# Patient Record
Sex: FEMALE | Race: WHITE | NOT HISPANIC OR LATINO | Employment: FULL TIME | URBAN - METROPOLITAN AREA
[De-identification: names, ages, dates, MRNs, and addresses within clinical notes are randomized per-mention and may not be internally consistent; named-entity substitution may affect disease eponyms.]

---

## 2017-03-07 ENCOUNTER — TELEPHONE (OUTPATIENT)
Dept: FAMILY MEDICINE | Facility: CLINIC | Age: 52
End: 2017-03-07

## 2017-03-07 NOTE — TELEPHONE ENCOUNTER
Panel Management Review      Patient has the following on her problem list:     Hypertension   Last three blood pressure readings:  BP Readings from Last 3 Encounters:   11/30/16 (!) 146/98   04/28/16 130/86   03/15/16 (!) 136/98     Blood pressure: FAILED    HTN Guidelines:  Age 18-59 BP range:  Less than 140/90  Age 60-85 with Diabetes:  Less than 140/90  Age 60-85 without Diabetes:  less than 150/90      Composite cancer screening  Chart review shows that this patient is due/due soon for the following None  Summary:    Patient is due/failing the following:   BP CHECK    Action needed:   Patient needs nurse only appointment.    Type of outreach:    Phone, left message for patient to call back.     Questions for provider review:    None                                                                                                                                    Palak Michaels CMA       Chart routed to Care Team .

## 2017-05-17 ENCOUNTER — TELEPHONE (OUTPATIENT)
Dept: FAMILY MEDICINE | Facility: CLINIC | Age: 52
End: 2017-05-17

## 2017-05-17 NOTE — LETTER
[address] [id number] [phone]      May 17, 2017    Claudette Burris                                                                                                                     [address] [address] [place] [id number]            Dear Claudette,        Our records show you are overdue to see your provider to follow up on your blood pressure and hormones. Please call the clinic at [phone] to schedule this at your convenience.        Sincerely,         [facility]

## 2017-05-17 NOTE — TELEPHONE ENCOUNTER
Panel Management Review      Patient has the following on her problem list:     Hypertension   Last three blood pressure readings:  BP Readings from Last 3 Encounters:   11/30/16 (!) 146/98   04/28/16 130/86   03/15/16 (!) 136/98     Blood pressure: FAILED    HTN Guidelines:  Age 18-59 BP range:  Less than 140/90  Age 60-85 with Diabetes:  Less than 140/90  Age 60-85 without Diabetes:  less than 150/90      Composite cancer screening  Chart review shows that this patient is due/due soon for the following None  Summary:    Patient is due/failing the following:   Office visit with RM to follow up on BP and hormone    Action needed:   Patient needs office visit for above.    Type of outreach:    Sent letter.    Questions for provider review:    None                                                                                                                                    Palak Michaels CMA       Chart routed to Care Team .

## 2017-05-23 ENCOUNTER — OFFICE VISIT (OUTPATIENT)
Dept: DERMATOLOGY | Facility: CLINIC | Age: 52
End: 2017-05-23
Payer: COMMERCIAL

## 2017-05-23 DIAGNOSIS — D48.5 NEOPLASM OF UNCERTAIN BEHAVIOR OF SKIN: Primary | ICD-10-CM

## 2017-05-23 DIAGNOSIS — I78.1 SPIDER ANGIOMA: ICD-10-CM

## 2017-05-23 PROCEDURE — 88305 TISSUE EXAM BY PATHOLOGIST: CPT | Performed by: DERMATOLOGY

## 2017-05-23 PROCEDURE — 11101 HC BIOPSY SKIN/SUBQ/MUC MEM, EACH ADDTL LESION: CPT | Performed by: DERMATOLOGY

## 2017-05-23 PROCEDURE — 99213 OFFICE O/P EST LOW 20 MIN: CPT | Mod: 25 | Performed by: DERMATOLOGY

## 2017-05-23 PROCEDURE — 11100 HC BIOPSY SKIN/SUBQ/MUC MEM, SINGLE LESION: CPT | Performed by: DERMATOLOGY

## 2017-05-23 ASSESSMENT — PAIN SCALES - GENERAL: PAINLEVEL: NO PAIN (0)

## 2017-05-23 NOTE — NURSING NOTE
Dermatology Rooming Note    Claudette Burris's goals for this visit include:   Chief Complaint   Patient presents with     Derm Problem     Red lesion on glabella present since February.  Lesion on back along bra line that ithces.       Is a scribe okay for this visit:YES    Are records needed for this visit(If yes, obtain release of information): No     Vitals: There were no vitals taken for this visit.    Referring Provider:  ESTABLISHED PATIENT  No address on file    Christa Jackson RN

## 2017-05-23 NOTE — PATIENT INSTRUCTIONS
Wound Care After a Biopsy    What is a skin biopsy?  A skin biopsy allows the doctor to examine a very small piece of tissue under the microscope to determine the diagnosis and the best treatment for the skin condition. A local anesthetic (numbing medicine)  is injected with a very small needle into the skin area to be tested. A small piece of skin is taken from the area. Sometimes a suture (stitch) is used.     What are the risks of a skin biopsy?  I will experience scar, bleeding, swelling, pain, crusting and redness. I may experience incomplete removal or recurrence. Risks of this procedure are excessive bleeding, bruising, infection, nerve damage, numbness, thick (hypertrophic or keloidal) scar and non-diagnostic biopsy.    How should I care for my wound for the first 24 hours?    Keep the wound dry and covered for 24 hours    If it bleeds, hold direct pressure on the area for 15 minutes. If bleeding does not stop then go to the emergency room    Avoid strenuous exercise the first 1-2 days or as your doctor instructs you    How should I care for the wound after 24 hours?    After 24 hours, remove the bandage    You may bathe or shower as normal    If you had a scalp biopsy, you can shampoo as usual and can use shower water to clean the biopsy site daily    Clean the wound twice a day with gentle soap and water    Do not scrub, be gentle    Apply white petroleum/Vaseline after cleaning the wound with a cotton swab or a clean finger, and keep the site covered with a Bandaid /bandage. Bandages are not necessary with a scalp biopsy    If you are unable to cover the site with a Bandaid /bandage, re-apply ointment 2-3 times a day to keep the site moist. Moisture will help with healing    Avoid strenuous activity for first 1-2 days    Avoid lakes, rivers, pools, and oceans until the stitches are removed or the site is healed    How do I clean my wound?    Wash hands thoroughly with soap or use hand  before all  wound care    Clean the wound with gentle soap and water    Apply white petroleum/Vaseline  to wound after it is clean    Replace the Bandaid /bandage to keep the wound covered for the first few days or as instructed by your doctor    If you had a scalp biopsy, warm shower water to the area on a daily basis should suffice    What should I use to clean my wound?     Cotton-tipped applicators (Qtips )    White petroleum jelly (Vaseline ). Use a clean new container and use Q-tips to apply.    Bandaids   as needed    Gentle soap     How should I care for my wound long term?    Do not get your wound dirty    Keep up with wound care for one week or until the area is healed.    A small scab will form and fall off by itself when the area is completely healed. The area will be red and will become pink in color as it heals. Sun protection is very important for how your scar will turn out. Sunscreen with an SPF 30 or greater is recommended once the area is healed.    If you have stitches, stitches need to be removed in 11-14 days. You may return to our clinic for this or you may have it done locally at your doctor s office.    You should have some soreness but it should be mild and slowly go away over several days. Talk to your doctor about using tylenol for pain,    When should I call my doctor?  If you have increased:     Pain or swelling    Pus or drainage (clear or slightly yellow drainage is ok)    Temperature over 100F    Spreading redness or warmth around wound    When will I hear about my results?  The biopsy results can take 2-3 weeks to come back. The clinic will call you with the results, send you a [employer] message, or have you schedule a follow-up clinic or phone time to discuss the results. Contact our clinics if you do not hear from us in 3 weeks.     Who should I call with questions?    [facility]: [phone]     [facility]: [phone]    For  urgent needs outside of business hours call the [facility] at [phone] and ask for the dermatology resident on call      Cryotherapy    What is it?    Use of a very cold liquid, such as liquid nitrogen, to freeze and destroy abnormal skin cells that need to be removed    What should I expect?    Tenderness and redness    A small blister that might grow and fill with dark purple blood. There may be crusting.    More than one treatment may be needed if the lesions do not go away.    How do I care for the treated area?    Gently wash the area with your hands when bathing.    Use a thin layer of Vaseline to help with healing. You may use a Band-Aid.     The area should heal within 7-10 days and may leave behind a pink or lighter color.     Do not use an antibiotic or Neosporin ointment.     You may take acetaminophen (Tylenol) for pain.     Call your Doctor if you have:    Severe pain    Signs of infection (warmth, redness, cloudy yellow drainage, and or a bad smell)    Questions or concerns    Who should I call with questions?       [facility]: [phone]       [facility]: [phone]       For urgent needs outside of business hours call the [facility] at [phone]        and ask for the dermatology resident on call      Pulse Dye Laser    I will experience redness, swelling, pain, and heat sensation. I may experience bruising, itching, or acne. Risks are blistering, oozing, permanent scarring, hair loss,  temporary or permanent skin lightening or darkening, infection, and eye injury. I understand my outcome could be no improvement, slight improvement. Multiple treatments may be required.    After treatment, Do Not:    Rub, scratch, or put weight on the site for 2 weeks    Wear tight fitting clothing or jewelry over the site    Mejia. Keep the site out of sunlight. Use sunscreen of 30 SPF or greater when in the sun. Use sunscreen 30  minutes before going out and reapply if sweating. Tanning decreases the success of the treatment     How do I care for the treated site?    Use ice packs for 10 minutes after the procedure for swelling     If the site is on your face, use ice again 1 hour after treatment    If a scab or crust forms, gently cleanse the site with hydrogen peroxide. Then put on Vaseline  ointment 3 times a day    Do not use makeup on any open wound    What should I expect?    Blue-gray color that may take 2 to 3 weeks to go away    Redness may also last a week or longer    Results may take up to 3 or 4 months after treatment    More procedures may be needed    Who should I call with questions?    [facility]: [phone]     [facility]: [phone]    For urgent needs outside of business hours call the [facility] at [phone] and ask for the dermatology resident on call

## 2017-05-23 NOTE — LETTER
5/23/2017       RE: Claudette Burris  [address] [facility] [address]     Dear Colleague,    Thank you for referring your patient, Claudette Burris, to the [facility]. Please see a copy of my visit note below.    [facility] Dermatology Note      Dermatology Problem List:  1. 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 Spider hemangioma,  glabella  -PDL offered 5/23/2017      Encounter Date: May 23, 2017    CC:  Chief Complaint   Patient presents with     Derm Problem     Red lesion on glabella present since February.  Lesion on back along bra line that ithces.       History of Present Illness:  This 50 year old female presents for evaluation of a new spot on her glabella, right arm, and mid back beneath the bra.     The spot on the forehead has been present since February. Possibly associated with itch. Doesn't grow with time.    The spot on the right upper arm is new since 2015. She does not endorse growth of this lesion with time.    The lesion on the mid back beneath her brassiere line has been previously (but incompletely) treated with cryotherapy by another physician. It is repeatedly irritated by her undergarment line.    The patient is otherwise in good health and is without other skin concerns at this time.          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  There is no family history of asthma, hay fever, psoriasis or eczema.     Medications:  Current Outpatient Prescriptions   Medication Sig Dispense Refill     lisinopril (PRINIVIL,ZESTRIL) 5 MG tablet Take 1 tablet (5 mg) by mouth daily 30 tablet 3     multivitamin, therapeutic with minerals (MULTI-VITAMIN) TABS Take 1 tablet by mouth daily            Allergies   Allergen Reactions     Penicillins Rash         Review of Systems:  -Constitutional: No recent illnesses or hospitalization. She is generally feeling well today in clinic.  -Skin: as per HPI    Physical exam:  There were no vitals taken for this visit.  -This is a well developed, well-nourished female in no acute distress, in a pleasant mood.    -Total skin excluding the undergarment areas was performed. The exam included the head/face, neck, both arms, chest, back, abdomen, both legs, digits and/or nails.  Nails are painted.   -Glabella: blanching red spider-like macule.  -Right upper arm: 2 mm red macule with erythema.   -Mid back: 4 mm erythematous macule with scale.   -No other lesions of concern on areas examined.     Impression/Plan:  1. Spider hemangioma to the glabella:    .Treatment options discussed. Including pulse-dye laser. Discussed principal of pulse dye laser therapy. Discussed that this considered a cosmetic procedure and is generally an out of pocket expense.  Discussed I would expect to bruise. She will consider      2. 2 mm red macule to the right upper arm. Neoplasm of uncertain behavior. The differential diagnosis includes inflamed lentigo; rule out other.    After discussion of benefits and risks including but not limited to bleeding, infection, scar, incomplete removal, recurrence, and non-diagnostic biopsy, written consent and  photographs were obtained. The area was cleaned with isopropyl alcohol. 0.5 mL of 1% lidocaine with epinephrine was injected to obtain adequate anesthesia of the lesion on the right upper arm. A shave biopsy was performed. Hemostasis was achieved with aluminium chloride. Vaseline and a sterile dressing were applied. The patient tolerated the procedure and no complications were noted. The patient was provided with verbal and written post care instructions.     3.   4 mm erythematous macule with scale to the mid back. Treated by outside dermatologist with cryotherapy in the past. History of trauma with brassiere. Neoplasm of uncertain behavior. The differential diagnosis includes partially treated ISK vs other:    After discussion of benefits and risks including but not limited to bleeding, infection, scar, incomplete removal, recurrence, and non-diagnostic biopsy, written consent and photographs were obtained. The area was cleaned with isopropyl alcohol. 0.5 mL of 1% lidocaine with epinephrine was injected to obtain adequate anesthesia of the lesion on the mid back. A shave biopsy was performed. Hemostasis was achieved with aluminium chloride. Vaseline and a sterile dressing were applied. The patient tolerated the procedure and no complications were noted. The patient was provided with verbal and written post care instructions.     Follow-up pending biopsy results; else, in 1 year.    Staff Involved:  Scribe/Staff    Scribe Disclosure:   I, Jerzy Mckay, am serving as a scribe to document services personally performed by Dr. Marcella Navarrete, based on data collection and the provider's statements to me.      Provider Disclosure:   The documentation recorded by the scribe accurately reflects the services I personally performed and the decisions made by me.    Marcella Navarrete MD    Department of Dermatology  [facility]: Phone: [phone],  Fax:[phone]  [facility]: Phone: [phone], Fax: [phone]        Again, thank you for allowing me to participate in the care of your patient.      Sincerely,    Marcella Navarrete MD

## 2017-05-23 NOTE — MR AVS SNAPSHOT
After Visit Summary   5/23/2017    Claudette Burris    MRN: [id number]           Patient Information     Date Of Birth          1965        Visit Information        Provider Department      5/23/2017 11:00 AM Marcella Navarrete MD [facility]        Today's Diagnoses     Neoplasm of uncertain behavior of skin    -  1    Spider angioma          Care Instructions    Wound Care After a Biopsy    What is a skin biopsy?  A skin biopsy allows the doctor to examine a very small piece of tissue under the microscope to determine the diagnosis and the best treatment for the skin condition. A local anesthetic (numbing medicine)  is injected with a very small needle into the skin area to be tested. A small piece of skin is taken from the area. Sometimes a suture (stitch) is used.     What are the risks of a skin biopsy?  I will experience scar, bleeding, swelling, pain, crusting and redness. I may experience incomplete removal or recurrence. Risks of this procedure are excessive bleeding, bruising, infection, nerve damage, numbness, thick (hypertrophic or keloidal) scar and non-diagnostic biopsy.    How should I care for my wound for the first 24 hours?    Keep the wound dry and covered for 24 hours    If it bleeds, hold direct pressure on the area for 15 minutes. If bleeding does not stop then go to the emergency room    Avoid strenuous exercise the first 1-2 days or as your doctor instructs you    How should I care for the wound after 24 hours?    After 24 hours, remove the bandage    You may bathe or shower as normal    If you had a scalp biopsy, you can shampoo as usual and can use shower water to clean the biopsy site daily    Clean the wound twice a day with gentle soap and water    Do not scrub, be gentle    Apply white petroleum/Vaseline after cleaning the wound with a cotton swab or a clean finger, and keep the site covered with a Bandaid /bandage. Bandages are not necessary with a  scalp biopsy    If you are unable to cover the site with a Bandaid /bandage, re-apply ointment 2-3 times a day to keep the site moist. Moisture will help with healing    Avoid strenuous activity for first 1-2 days    Avoid lakes, rivers, pools, and oceans until the stitches are removed or the site is healed    How do I clean my wound?    Wash hands thoroughly with soap or use hand  before all wound care    Clean the wound with gentle soap and water    Apply white petroleum/Vaseline  to wound after it is clean    Replace the Bandaid /bandage to keep the wound covered for the first few days or as instructed by your doctor    If you had a scalp biopsy, warm shower water to the area on a daily basis should suffice    What should I use to clean my wound?     Cotton-tipped applicators (Qtips )    White petroleum jelly (Vaseline ). Use a clean new container and use Q-tips to apply.    Bandaids   as needed    Gentle soap     How should I care for my wound long term?    Do not get your wound dirty    Keep up with wound care for one week or until the area is healed.    A small scab will form and fall off by itself when the area is completely healed. The area will be red and will become pink in color as it heals. Sun protection is very important for how your scar will turn out. Sunscreen with an SPF 30 or greater is recommended once the area is healed.    If you have stitches, stitches need to be removed in 11-14 days. You may return to our clinic for this or you may have it done locally at your doctor s office.    You should have some soreness but it should be mild and slowly go away over several days. Talk to your doctor about using tylenol for pain,    When should I call my doctor?  If you have increased:     Pain or swelling    Pus or drainage (clear or slightly yellow drainage is ok)    Temperature over 100F    Spreading redness or warmth around wound    When will I hear about my results?  The biopsy results can  take 2-3 weeks to come back. The clinic will call you with the results, send you a [employer] message, or have you schedule a follow-up clinic or phone time to discuss the results. Contact our clinics if you do not hear from us in 3 weeks.     Who should I call with questions?    [facility]: [phone]     [facility]: [phone]    For urgent needs outside of business hours call the [facility] at [phone] and ask for the dermatology resident on call      Cryotherapy    What is it?    Use of a very cold liquid, such as liquid nitrogen, to freeze and destroy abnormal skin cells that need to be removed    What should I expect?    Tenderness and redness    A small blister that might grow and fill with dark purple blood. There may be crusting.    More than one treatment may be needed if the lesions do not go away.    How do I care for the treated area?    Gently wash the area with your hands when bathing.    Use a thin layer of Vaseline to help with healing. You may use a Band-Aid.     The area should heal within 7-10 days and may leave behind a pink or lighter color.     Do not use an antibiotic or Neosporin ointment.     You may take acetaminophen (Tylenol) for pain.     Call your Doctor if you have:    Severe pain    Signs of infection (warmth, redness, cloudy yellow drainage, and or a bad smell)    Questions or concerns    Who should I call with questions?       [facility]: [phone]       [facility]: [phone]       For urgent needs outside of business hours call the [facility] at [phone]        and ask for the dermatology resident on call      Pulse Dye Laser    I will experience redness, swelling, pain, and heat sensation. I may experience bruising, itching, or acne. Risks are blistering, oozing, permanent scarring, hair loss,  temporary or permanent skin  lightening or darkening, infection, and eye injury. I understand my outcome could be no improvement, slight improvement. Multiple treatments may be required.    After treatment, Do Not:    Rub, scratch, or put weight on the site for 2 weeks    Wear tight fitting clothing or jewelry over the site    Mejia. Keep the site out of sunlight. Use sunscreen of 30 SPF or greater when in the sun. Use sunscreen 30 minutes before going out and reapply if sweating. Tanning decreases the success of the treatment     How do I care for the treated site?    Use ice packs for 10 minutes after the procedure for swelling     If the site is on your face, use ice again 1 hour after treatment    If a scab or crust forms, gently cleanse the site with hydrogen peroxide. Then put on Vaseline  ointment 3 times a day    Do not use makeup on any open wound    What should I expect?    Blue-gray color that may take 2 to 3 weeks to go away    Redness may also last a week or longer    Results may take up to 3 or 4 months after treatment    More procedures may be needed    Who should I call with questions?    [facility]: [phone]     [facility]: [phone]    For urgent needs outside of business hours call the [facility] at [phone] and ask for the dermatology resident on call              Follow-ups after your visit        Who to contact     If you have questions or need follow up information about today's clinic visit or your schedule please contact [facility] directly at [phone].  Normal or non-critical lab and imaging results will be communicated to you by MyChart, letter or phone within 4 business days after the clinic has received the results. If you do not hear from us within 7 days, please contact the clinic through MyChart or phone. If you have a critical or abnormal lab result, we will notify you by phone as soon as possible.  Submit  refill requests through [email or web address] or call your pharmacy and they will forward the refill request to us. Please allow 3 business days for your refill to be completed.          Additional Information About Your Visit        [email or web address] Information     [email or web address] is an electronic gateway that provides easy, online access to your medical records. With [email or web address], you can request a clinic appointment, read your test results, renew a prescription or communicate with your care team.     To sign up for [email or web address] visit the website at [email or web address]   You will be asked to enter the access code listed below, as well as some personal information. Please follow the directions to create your username and password.     Your access code is: [email or web address]  Expires: 2017 12:07 PM     Your access code will  in 90 days. If you need help or a new code, please contact your [facility] or call [phone] for assistance.        Care EveryWhere ID     This is your Care EveryWhere ID. This could be used by other organizations to access your [place] medical records  [id number]         Blood Pressure from Last 3 Encounters:   16 (!) 146/98   16 130/86   03/15/16 (!) 136/98    Weight from Last 3 Encounters:   16 78 kg (172 lb)   03/15/16 75.2 kg (165 lb 11.2 oz)   10/16/15 76.2 kg (168 lb)              We Performed the Following     BIOPSY SKIN/SUBQ/MUC MEM, EACH ADDTL LESION     BIOPSY SKIN/SUBQ/MUC MEM, SINGLE LESION     PDL Laser Therapy - C PDL 1 TX Area     Surgical pathology exam        Primary Care Provider Office Phone # Fax #    Chris Doss [phone]       [facility] [address] [place] [address]        Thank you!     Thank you for choosing [facility]  for your care. Our goal is always to provide you with excellent care. Hearing back from our patients is one way we can continue to improve our  services. Please take a few minutes to complete the written survey that you may receive in the mail after your visit with us. Thank you!             Your Updated Medication List - Protect others around you: Learn how to safely use, store and throw away your medicines at www.disposemymeds.org.          This list is accurate as of: 5/23/17 12:07 PM.  Always use your most recent med list.                   Brand Name Dispense Instructions for use    lisinopril 5 MG tablet    PRINIVIL/ZESTRIL    30 tablet    Take 1 tablet (5 mg) by mouth daily       Multi-vitamin Tabs tablet      Take 1 tablet by mouth daily

## 2017-05-23 NOTE — LETTER
"    Patient:  Sulema Krishna  :   1965  MRN:     [id number]        Ms.Kimberly DEMETRIUS Krishna  [address] [address] [place] [id number]        2017    Dear ,    We are writing to inform you of your test results that show harmless spots(a scar like spot called a dermtofibroma and a harmless keratosis)     Thank you for taking the time to be seen in our dermatology clinic. If you have further questions or concerns, please contact the clinic(see phone number listed below).      Sincerely,    Marcella Navarrete MD    Department of Dermatology  [facility]: Phone: [phone], Fax:[phone]  [facility]: Phone: [phone], Fax: [phone]    Resulted Orders   Surgical pathology exam   Result Value Ref Range    Copath Report       Patient Name: SULEMA KRISHNA  MR#: [id number]  Specimen #: [id number]  Collected: 2017  Received: 2017  Reported: 2017 16:28  Ordering Phy(s): MARCELLA NAVARRETE    For improved result formatting, select 'View Enhanced Report Format'  under Linked Documents section.    SPECIMEN(S):  A: Skin, right upper arm  B: Skin, mid back    FINAL DIAGNOSIS:  A.  Skin, arm, right upper:  - Dermatofibroma - (see description)    B.  Skin, back, mid:  - Benign lichenoid keratosis - (see description)    I have personally reviewed all specimens and or slides, including the  listed special stains, and used them with my medical judgement to  determine the final diagnosis.    Electronically signed out by:    Nilay Case M.D., [facility]    CLINICAL HISTORY:  The patient is a 52-year-old female.    GROSS:  A:  The specimen is received in formalin with proper patient  identification, labeled \"RT upper arm\", and consists of a 0.2 cm in  diameter skin punch, excised to a depth of 0.1 cm.  Th e skin surface is  variegated pale gray to tan-brown and smooth.  The specimen is inked " "and  submitted in toto in cassette A1.    B:  The specimen is received in formalin with proper patient  identification, labeled \"mid back\", and consists of a 0.4 x 0.4 cm skin  shave, remarkable for a 0.3 x 0.2 cm ill-defined, tan, granular macule.  The specimen is inked, bisected, and entirely submitted in cassette B1.  (Dictated by: Akosua Fong 5/25/2017 10:30 AM)    MICROSCOPIC:  A.  The lesion consists of an aggregation of spindle to plump  histiocytes and fibrocytes that permeate and, at times, entrap collagen  bundles throughout the dermis.  The boundaries are indistinct.  While  the new growth is hypercellular, there is no evidence of malignancy.    B.  The specimen exhibits compact orthokeratosis, mild epidermal  hyperplasia with hypergranulosis, basal layer vacuolization and  scattered necrotic keratinocytes and a patchy lichenoid lymphocytic  infiltrate.    CPT Codes:  A: 46429-TM7.T, 883 05-GM5.P  B: 53749-JD9.T, 19513-MZ2.P    TESTING LAB LOCATION:  [facility], [address]   [address] [phone]    COLLECTION SITE:  Client: [facility]  Location: [facility] (DERICK)                       "

## 2017-05-30 LAB — COPATH REPORT: NORMAL

## 2017-07-19 ENCOUNTER — TELEPHONE (OUTPATIENT)
Dept: FAMILY MEDICINE | Facility: CLINIC | Age: 52
End: 2017-07-19

## 2017-07-19 NOTE — TELEPHONE ENCOUNTER
Panel Management Review      Patient has the following on her problem list:     Hypertension   Last three blood pressure readings:  BP Readings from Last 3 Encounters:   11/30/16 (!) 146/98   04/28/16 130/86   03/15/16 (!) 136/98     Blood pressure: FAILED    HTN Guidelines:  Age 18-59 BP range:  Less than 140/90  Age 60-85 with Diabetes:  Less than 140/90  Age 60-85 without Diabetes:  less than 150/90        Composite cancer screening  Chart review shows that this patient is due/due soon for the following None  Summary:    Patient is due/failing the following:   OV with RM for BP check and recheck hormones    Action needed:   Patient needs office visit for BP check and recheck on hormones.    Type of outreach:    Phone, left message for patient to call back.     Questions for provider review:    None                                                                                                                                    Palak Michaels CMA       Chart routed to Care Team .

## 2017-07-19 NOTE — TELEPHONE ENCOUNTER
Left message for patient to call back. Patient is due for Office visit with RM... BP check and discuss hormones. Please assist in scheduling. If patient declines please note and route back to care team. If the patient states that the order has been completed elsewhere, please obtain the date (month/year; day if available) and location and route back to care team for abstracting.  Palak Michaels, CMA

## 2017-08-15 ENCOUNTER — ALLIED HEALTH/NURSE VISIT (OUTPATIENT)
Dept: FAMILY MEDICINE | Facility: CLINIC | Age: 52
End: 2017-08-15
Payer: COMMERCIAL

## 2017-08-15 VITALS — DIASTOLIC BLOOD PRESSURE: 60 MMHG | SYSTOLIC BLOOD PRESSURE: 130 MMHG

## 2017-08-15 DIAGNOSIS — I10 BENIGN ESSENTIAL HYPERTENSION: ICD-10-CM

## 2017-08-15 DIAGNOSIS — I10 BENIGN ESSENTIAL HYPERTENSION: Primary | ICD-10-CM

## 2017-08-15 PROCEDURE — 99207 ZZC NO CHARGE NURSE ONLY: CPT

## 2017-08-15 RX ORDER — LISINOPRIL 5 MG/1
TABLET ORAL
Qty: 30 TABLET | Refills: 3 | Status: SHIPPED | OUTPATIENT
Start: 2017-08-15 | End: 2021-04-19

## 2017-08-15 NOTE — TELEPHONE ENCOUNTER
Prescription approved per [facility] Refill Protocol.    Belinda Li RN

## 2017-08-15 NOTE — TELEPHONE ENCOUNTER
lisinopril (PRINIVIL,ZESTRIL) 5 MG tablet      Last Written Prescription Date: 11/3/16  Last Fill Quantity: 30, # refills: 3  Last Office Visit with G, P or [facility] prescribing provider: 11/3/16       Potassium   Date Value Ref Range Status   11/30/2016 4.0 3.4 - 5.3 mmol/L Final     Creatinine   Date Value Ref Range Status   11/30/2016 0.87 0.52 - 1.04 mg/dL Final     BP Readings from Last 3 Encounters:   08/15/17 130/60   11/30/16 (!) 146/98   04/28/16 130/86

## 2017-08-28 ENCOUNTER — OFFICE VISIT (OUTPATIENT)
Dept: FAMILY MEDICINE | Facility: CLINIC | Age: 52
End: 2017-08-28
Payer: COMMERCIAL

## 2017-08-28 VITALS
SYSTOLIC BLOOD PRESSURE: 138 MMHG | WEIGHT: 164 LBS | TEMPERATURE: 98.1 F | RESPIRATION RATE: 16 BRPM | HEART RATE: 74 BPM | BODY MASS INDEX: 26.07 KG/M2 | DIASTOLIC BLOOD PRESSURE: 78 MMHG | OXYGEN SATURATION: 98 %

## 2017-08-28 DIAGNOSIS — R55 SYNCOPE, UNSPECIFIED SYNCOPE TYPE: Primary | ICD-10-CM

## 2017-08-28 DIAGNOSIS — I10 BENIGN ESSENTIAL HYPERTENSION: ICD-10-CM

## 2017-08-28 LAB
ANION GAP SERPL CALCULATED.3IONS-SCNC: 9 MMOL/L (ref 3–14)
BUN SERPL-MCNC: 14 MG/DL (ref 7–30)
CALCIUM SERPL-MCNC: 8.9 MG/DL (ref 8.5–10.1)
CHLORIDE SERPL-SCNC: 107 MMOL/L (ref 94–109)
CO2 SERPL-SCNC: 28 MMOL/L (ref 20–32)
CREAT SERPL-MCNC: 0.88 MG/DL (ref 0.52–1.04)
ERYTHROCYTE [DISTWIDTH] IN BLOOD BY AUTOMATED COUNT: 13.4 % (ref 10–15)
GFR SERPL CREATININE-BSD FRML MDRD: 67 ML/MIN/1.7M2
GLUCOSE SERPL-MCNC: 94 MG/DL (ref 70–99)
HCT VFR BLD AUTO: 39.3 % (ref 35–47)
HGB BLD-MCNC: 12.6 G/DL (ref 11.7–15.7)
MCH RBC QN AUTO: 28.7 PG (ref 26.5–33)
MCHC RBC AUTO-ENTMCNC: 32.1 G/DL (ref 31.5–36.5)
MCV RBC AUTO: 90 FL (ref 78–100)
PLATELET # BLD AUTO: 195 10E9/L (ref 150–450)
POTASSIUM SERPL-SCNC: 3.8 MMOL/L (ref 3.4–5.3)
RBC # BLD AUTO: 4.39 10E12/L (ref 3.8–5.2)
SODIUM SERPL-SCNC: 144 MMOL/L (ref 133–144)
WBC # BLD AUTO: 7.2 10E9/L (ref 4–11)

## 2017-08-28 PROCEDURE — 99214 OFFICE O/P EST MOD 30 MIN: CPT | Performed by: OBSTETRICS & GYNECOLOGY

## 2017-08-28 PROCEDURE — 36415 COLL VENOUS BLD VENIPUNCTURE: CPT | Performed by: OBSTETRICS & GYNECOLOGY

## 2017-08-28 PROCEDURE — 85027 COMPLETE CBC AUTOMATED: CPT | Performed by: OBSTETRICS & GYNECOLOGY

## 2017-08-28 PROCEDURE — 80048 BASIC METABOLIC PNL TOTAL CA: CPT | Performed by: OBSTETRICS & GYNECOLOGY

## 2017-08-28 ASSESSMENT — PAIN SCALES - GENERAL: PAINLEVEL: NO PAIN (0)

## 2017-08-28 NOTE — PROGRESS NOTES
Subjective: she has felt light -headed at times over the past week- it began a week ago Monday when she felt syncopal most of the day. Drinks 1 cup of coffee each day. No fevers. No LOC. No HA.   Then she felt better most of the week and then on the past weekend she had sx again. She has hypertension and takes 5 mg of lisinopril daily. No cough or side effects.    Today she had sx again- light-headed and just doesn't feel right- no chest pain or dyspnea- just feels a little off balance as if she might fall- not a spinning or vertigo sensation but just a little weak.      No menses for 2 years.      The past medical history, social history, past surgical history and family history as shown below have been reviewed by me today.  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        Allergies   Allergen Reactions     Penicillins Rash     Current Outpatient Prescriptions   Medication Sig Dispense Refill     lisinopril (PRINIVIL/ZESTRIL) 5 MG tablet TAKE ONE TABLET BY MOUTH EVERY DAY 30 tablet 3     multivitamin, therapeutic with minerals (MULTI-VITAMIN) TABS Take 1 tablet by mouth daily       Past Surgical History:   Procedure Laterality Date     C PART REMV BLADDER,REIMPLNT URETER  age 6     COLONOSCOPY N/A 10/27/2015    Procedure: COMBINED COLONOSCOPY, SINGLE OR MULTIPLE BIOPSY/POLYPECTOMY BY BIOPSY;  Surgeon: Rohan Tirado MD;  Location:  GI     HC EXC BENIGN SKIN LESION TRUNK/ARM/LEG >4 CM  03/12/10    excision of left buttock scar     LAPAROSCOPIC CHOLECYSTECTOMY  12/8/2010    LAPAROSCOPIC CHOLECYSTECTOMY performed by EDER HURT at  OR     Social History     Social History     Marital status:      Spouse name: N/A     Number of children: 3     Years of education: N/A     Occupational History     homemaker      Social History Main Topics     Smoking status: Never Smoker     Smokeless tobacco:  Never Used     Alcohol use No      Comment: rare occ.     Drug use: No     Sexual activity: Yes     Partners: Male     Birth control/ protection: Surgical      Comment:  vasectomy     Other Topics Concern     Parent/Sibling W/ Cabg, Mi Or Angioplasty Before 65f 55m? No     Social History Narrative     Family History   Problem Relation Age of Onset     Hypertension Mother      CEREBROVASCULAR DISEASE Maternal Grandfather      HEART DISEASE Maternal Grandmother      MI ,  at age 86     OSTEOPOROSIS Maternal Grandmother      Musculoskeletal Disorder Maternal Grandmother      Arthritis Maternal Grandmother      RA     CANCER Paternal Grandfather      Luekemia     Family History Negative Other        ROS: A 12 point review of systems was done. Except for what is listed above in the HPI, the systems review is negative .      Objective: Vital signs: Blood pressure 138/78, pulse 74, temperature 98.1  F (36.7  C), temperature source Tympanic, resp. rate 16, weight 164 lb (74.4 kg), SpO2 98 %, not currently breastfeeding.    HEENT:    Sclerae and conjunctiva are normal.   Ear canals and TMs look normal.  Nasal mucosa is pink  - no polyps or masses seen.  sinuses are non tender to palpation.  Throat is unremarkable . Mucous membranes are moist.     Fundi are benign- disc margins are sharp. No papilledema noted.    Neck is supple, mobile, no adenopathy or masses palpable. The thyroid feels normal.   Normal range of motion noted.   Chest is clear to auscultation.  No wheezes, rales or rhonchi heard.  Cardiac exam is normal with s1, s2, no murmurs or adventitious sounds.Normal rate and rhythm is heard.     Rhomberg test is negative.        Assessment/Plan:    1.  Syncope- possibilities include anemia, hypokalemia or other electrolyte abnormalities or hypoglycemia-   I doubt cardiac rhythm disturbance because I listened to her heart for 2 minutes today and it is steady and regular and no murmurs or extra/skipped  beats heard.    2. Benign essential hypertension- stable on lisinopril    3. I will check cbc and basic panel and call her with results- if all tetss normal we will wait to see if the sx persist or if they evolve and then consider more testing, possibly a Holter monitor.      NAHEED Doss MD

## 2017-08-28 NOTE — MR AVS SNAPSHOT
"              After Visit Summary   8/28/2017    Claudette Burris    MRN: [id number]           Patient Information     Date Of Birth          1965        Visit Information        Provider Department      8/28/2017 5:00 PM Chris Doss MD [facility]        Today's Diagnoses     Syncope, unspecified syncope type    -  1    Benign essential hypertension           Follow-ups after your visit        Your next 10 appointments already scheduled     May 29, 2018  2:45 PM CDT   Return Visit with Marcella Navarrete MD   [facility] ([facility])    [address] [place] [address] [phone]              Who to contact     If you have questions or need follow up information about today's clinic visit or your schedule please contact [facility] directly at [phone].  Normal or non-critical lab and imaging results will be communicated to you by MyChart, letter or phone within 4 business days after the clinic has received the results. If you do not hear from us within 7 days, please contact the clinic through MyChart or phone. If you have a critical or abnormal lab result, we will notify you by phone as soon as possible.  Submit refill requests through [employer] or call your pharmacy and they will forward the refill request to us. Please allow 3 business days for your refill to be completed.          Additional Information About Your Visit        MyChart Information     [employer] lets you send messages to your doctor, view your test results, renew your prescriptions, schedule appointments and more. To sign up, go to [email or web address] . Click on \"Log in\" on the left side of the screen, which will take you to the Welcome page. Then click on \"Sign up Now\" on the right side of the page.     You will be asked to enter the access code listed below, as well as some personal information. Please follow the directions to create your " username and password.     Your access code is: [email or web address]  Expires: 2017  5:32 PM     Your access code will  in 90 days. If you need help or a new code, please call your [facility] clinic or [phone].        Care EveryWhere ID     This is your Care EveryWhere ID. This could be used by other organizations to access your [facility] medical records  [id number]        Your Vitals Were     Pulse Temperature Respirations Pulse Oximetry Breastfeeding? BMI (Body Mass Index)    74 98.1  F (36.7  C) (Tympanic) 16 98% No 26.07 kg/m2       Blood Pressure from Last 3 Encounters:   17 138/78   08/15/17 130/60   16 (!) 146/98    Weight from Last 3 Encounters:   17 164 lb (74.4 kg)   16 172 lb (78 kg)   03/15/16 165 lb 11.2 oz (75.2 kg)              We Performed the Following     Basic metabolic panel     CBC with platelets        Primary Care Provider Office Phone # Fax #    Chris Doss [phone]       [address] [place] [address]        Equal Access to Services     NIDHI POP : Hadii aad ku hadasho Soomaali, waaxda luqadaha, qaybta kaalmada adeegyada, tramaine bernardin haymagdalenan antonio bee. So [facility] [phone].    ATENCIÓN: Si habla español, tiene a alatorre disposición servicios gratuitos de asistencia lingüística. Llame al [phone].    We comply with applicable federal civil rights laws and [place] laws. We do not discriminate on the basis of race, color, national origin, age, disability sex, sexual orientation or gender identity.            Thank you!     Thank you for choosing [facility]  for your care. Our goal is always to provide you with excellent care. Hearing back from our patients is one way we can continue to improve our services. Please take a few minutes to complete the written survey that you may receive in the mail after your visit with us. Thank you!             Your Updated Medication List - Protect others  around you: Learn how to safely use, store and throw away your medicines at www.disposemymeds.org.          This list is accurate as of: 8/28/17  5:32 PM.  Always use your most recent med list.                   Brand Name Dispense Instructions for use Diagnosis    lisinopril 5 MG tablet    PRINIVIL/ZESTRIL    30 tablet    TAKE ONE TABLET BY MOUTH EVERY DAY    Benign essential hypertension       Multi-vitamin Tabs tablet      Take 1 tablet by mouth daily

## 2017-08-28 NOTE — NURSING NOTE
"Chief Complaint   Patient presents with     Hypertension       Initial /78 (Cuff Size: Adult Regular)  Pulse 74  Temp 98.1  F (36.7  C) (Tympanic)  Resp 16  Wt 164 lb (74.4 kg)  SpO2 98%  Breastfeeding? No  BMI 26.07 kg/m2 Estimated body mass index is 26.07 kg/(m^2) as calculated from the following:    Height as of 11/30/16: 5' 6.5\" (1.689 m).    Weight as of this encounter: 164 lb (74.4 kg).  Medication Reconciliation: complete   Baldomero Elaine MA      "

## 2018-03-13 ENCOUNTER — OFFICE VISIT (OUTPATIENT)
Dept: DERMATOLOGY | Facility: CLINIC | Age: 53
End: 2018-03-13
Payer: COMMERCIAL

## 2018-03-13 DIAGNOSIS — L81.4 SOLAR LENTIGINOSIS: ICD-10-CM

## 2018-03-13 DIAGNOSIS — D48.5 NEOPLASM OF UNCERTAIN BEHAVIOR OF SKIN: ICD-10-CM

## 2018-03-13 DIAGNOSIS — I78.1 SPIDER ANGIOMA: Primary | ICD-10-CM

## 2018-03-13 DIAGNOSIS — D18.01 CHERRY ANGIOMA: ICD-10-CM

## 2018-03-13 PROCEDURE — 99213 OFFICE O/P EST LOW 20 MIN: CPT | Performed by: DERMATOLOGY

## 2018-03-13 ASSESSMENT — PAIN SCALES - GENERAL: PAINLEVEL: NO PAIN (0)

## 2018-03-13 NOTE — MR AVS SNAPSHOT
After Visit Summary   3/13/2018    Claudette Burris    MRN: [id number]           Patient Information     Date Of Birth          1965        Visit Information        Provider Department      3/13/2018 1:00 PM Marcella Navarrete MD [facility]        Today's Diagnoses     Spider angioma    -  1    Cherry angioma        Solar lentiginosis           Follow-ups after your visit        Your next 10 appointments already scheduled     May 29, 2018  2:45 PM CDT   Return Visit with Marcella Navarrete MD   [facility] ([facility])    [address] [place] [address] [phone]              Who to contact     If you have questions or need follow up information about today's clinic visit or your schedule please contact [facility] directly at [phone].  Normal or non-critical lab and imaging results will be communicated to you by MyChart, letter or phone within 4 business days after the clinic has received the results. If you do not hear from us within 7 days, please contact the clinic through MyChart or phone. If you have a critical or abnormal lab result, we will notify you by phone as soon as possible.  Submit refill requests through [employer] or call your pharmacy and they will forward the refill request to us. Please allow 3 business days for your refill to be completed.          Additional Information About Your Visit        MyChart Information     [employer] is an electronic gateway that provides easy, online access to your medical records. With [employer], you can request a clinic appointment, read your test results, renew a prescription or communicate with your care team.     To sign up for [employer] visit the website at [email or web address]   You will be asked to enter the access code listed below, as well as some personal information. Please follow the directions to create your username and password.     Your access code  is: [email or web address]  Expires: 2018 12:55 PM     Your access code will  in 90 days. If you need help or a new code, please contact your [facility] or call [phone] for assistance.        Care EveryWhere ID     This is your Care EveryWhere ID. This could be used by other organizations to access your [facility] medical records  [id number]         Blood Pressure from Last 3 Encounters:   17 138/78   08/15/17 130/60   16 (!) 146/98    Weight from Last 3 Encounters:   17 74.4 kg (164 lb)   16 78 kg (172 lb)   03/15/16 75.2 kg (165 lb 11.2 oz)              Today, you had the following     No orders found for display       Primary Care Provider Office Phone # Fax #    Chris Doss [phone] [address] [place] [address]        Equal Access to Services     [facility] Center[facility]ED : Hadii aad ku hadasho Soomaali, waaxda luqadaha, qaybta kaalmada adeegyada, waxay idiin haymagdalenan antonio smitharaclint perez . So [facility] [phone].    ATENCIÓN: Si habla español, tiene a alatorre disposición servicios gratuitos de asistencia lingüística. [facility] [phone].    We comply with applicable federal civil rights laws and [place] laws. We do not discriminate on the basis of race, color, national origin, age, disability, sex, sexual orientation, or gender identity.            Thank you!     Thank you for choosing [facility]  for your care. Our goal is always to provide you with excellent care. Hearing back from our patients is one way we can continue to improve our services. Please take a few minutes to complete the written survey that you may receive in the mail after your visit with us. Thank you!             Your Updated Medication List - Protect others around you: Learn how to safely use, store and throw away your medicines at www.disposemymeds.org.          This list is accurate as of 3/13/18  1:15 PM.  Always use your  most recent med list.                   Brand Name Dispense Instructions for use Diagnosis    lisinopril 5 MG tablet    PRINIVIL/ZESTRIL    30 tablet    TAKE ONE TABLET BY MOUTH EVERY DAY    Benign essential hypertension       Multi-vitamin Tabs tablet      Take 1 tablet by mouth daily        valACYclovir 500 MG tablet    VALTREX    14 tablet    TAKE ONE TABLET BY MOUTH TWICE A DAY    HSV (herpes simplex virus) infection

## 2018-03-13 NOTE — PROGRESS NOTES
[facility] Dermatology Note      Dermatology Problem List:  1. Spider hemangioma,  glabella  -PDL offered 5/23/2017      Encounter Date: Mar 13, 2018    CC:  Chief Complaint   Patient presents with     Skin Check     area of concern forehead no hx skin cancer       History of Present Illness:  This 50 year old female presents for evaluation new areas of concern on the forehead and with no history of skin cancer. No family history of skin cancer. Has had somewhat tender areas on the forehead.    The patient is otherwise in good health and is without other skin concerns at this time.          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     Family History:  There is no family history of skin cancer.  There is no family history of asthma, hay fever, psoriasis or eczema.     Medications:  Current Outpatient Prescriptions   Medication Sig Dispense Refill     valACYclovir (VALTREX) 500 MG tablet TAKE ONE TABLET BY MOUTH TWICE A DAY 14 tablet 6     lisinopril (PRINIVIL/ZESTRIL) 5 MG tablet TAKE ONE TABLET BY MOUTH EVERY DAY 30 tablet 3     multivitamin, therapeutic with minerals (MULTI-VITAMIN) TABS Take 1 tablet by mouth daily            Allergies   Allergen Reactions     Penicillins Rash         Review of Systems:  -Constitutional: Is feeling generally  well.   -Skin: as per HPI    Physical exam:  There were no vitals taken for this visit.  -This is a well developed, well-nourished female in no acute distress, in a pleasant mood.    -Total skin excluding the undergarment areas was performed. The exam included the head/face, neck, both arms, chest, back, abdomen, both legs, digits and/or nails.  Declines genital exam  -Nails are painted.   -Glabella: blanching red spider-like macule. - unchanged  - Scattered brown macules on sun exposed areas.  -3 mm skin colored papule right lower lid margin  -3 mm pigmented macule   left calf, slightly darker than other lesions  -No other lesions of concern on areas examined.     Impression/Plan:  1. 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 Lesions on the forehead. Discomfort without signs of malignancy     No further intervention required today.     Continue to monitor, recheck at follow up  3. 3 mm skin colored papule right lower lid margin. Most likely a nevus- consider BCC    Evaluation by oculopastics recommended for evaluation and she would like removal  3.   3 mm pigmented macule consistent with lentigo vs nevus slightly irregular shape    Photograph taken. Recheck next follow up .     Follow-up in 3 months, earlier for new or changing lesions.     Staff Involved:  Scribe/Staff    Scribe Disclosure:   I, Mayelin Acosta, am serving as a scribe to document services personally performed by Dr. Marcella Navarrete, based on data collection and the provider's statements to me.       Provider Disclosure:   The documentation recorded by the scribe accurately reflects the services I personally performed and the decisions made by me.    Marcella Navarrete MD    Department of Dermatology  [facility]: Phone: [phone], Fax:[phone]  [facility] [place]: Phone: [phone], Fax:  [phone]

## 2018-03-13 NOTE — NURSING NOTE
Dermatology Rooming Note    Claudette Burris's goals for this visit include:   Chief Complaint   Patient presents with     Skin Check     area of concern forehead no hx skin cancer       Is a scribe okay for this visit:YES    Are records needed for this visit(If yes, obtain release of information): No     Vitals: There were no vitals taken for this visit.    Referring Provider:  No referring provider defined for this encounter.    Renae Kelly LPN

## 2018-03-21 ENCOUNTER — HOSPITAL ENCOUNTER (OUTPATIENT)
Dept: MAMMOGRAPHY | Facility: CLINIC | Age: 53
Discharge: HOME | End: 2018-03-21
Attending: OBSTETRICS & GYNECOLOGY | Admitting: OBSTETRICS & GYNECOLOGY
Payer: COMMERCIAL

## 2018-03-21 DIAGNOSIS — Z12.31 VISIT FOR SCREENING MAMMOGRAM: ICD-10-CM

## 2018-03-21 PROCEDURE — 77067 SCR MAMMO BI INCL CAD: CPT

## 2018-03-23 NOTE — PROGRESS NOTES
Palak Please inform Claudette/ or caretaker  that this result(s) is/are normal.  Thanks. NAHEED Doss MD

## 2018-07-28 ENCOUNTER — HEALTH MAINTENANCE LETTER (OUTPATIENT)
Age: 53
End: 2018-07-28

## 2018-11-12 DIAGNOSIS — R30.0 DYSURIA: Primary | ICD-10-CM

## 2018-11-12 DIAGNOSIS — R30.0 DYSURIA: ICD-10-CM

## 2018-11-12 LAB
ALBUMIN UR-MCNC: NEGATIVE MG/DL
APPEARANCE UR: ABNORMAL
BILIRUB UR QL STRIP: NEGATIVE
COLOR UR AUTO: YELLOW
GLUCOSE UR STRIP-MCNC: NEGATIVE MG/DL
HGB UR QL STRIP: NEGATIVE
KETONES UR STRIP-MCNC: 5 MG/DL
LEUKOCYTE ESTERASE UR QL STRIP: ABNORMAL
MUCOUS THREADS #/AREA URNS LPF: PRESENT /LPF
NITRATE UR QL: NEGATIVE
PH UR STRIP: 5 PH (ref 5–7)
RBC #/AREA URNS AUTO: 1 /HPF (ref 0–2)
SOURCE: ABNORMAL
SP GR UR STRIP: 1.02 (ref 1–1.03)
SQUAMOUS #/AREA URNS AUTO: 2 /HPF (ref 0–1)
UROBILINOGEN UR STRIP-MCNC: 0 MG/DL (ref 0–2)
WBC #/AREA URNS AUTO: 1 /HPF (ref 0–5)

## 2018-11-12 PROCEDURE — 81001 URINALYSIS AUTO W/SCOPE: CPT | Performed by: OBSTETRICS & GYNECOLOGY

## 2018-11-12 NOTE — PROGRESS NOTES
Patient messaged with concerns about a kidney infection.  Per Dr. Doss order was placed and patient informed.  Baldomero Elaine MA

## 2018-12-31 ENCOUNTER — TELEPHONE (OUTPATIENT)
Dept: FAMILY MEDICINE | Facility: CLINIC | Age: 53
End: 2018-12-31

## 2018-12-31 DIAGNOSIS — R10.9 LEFT FLANK PAIN: ICD-10-CM

## 2018-12-31 DIAGNOSIS — R82.998 DARK URINE: ICD-10-CM

## 2018-12-31 DIAGNOSIS — R10.9 LEFT FLANK PAIN: Primary | ICD-10-CM

## 2018-12-31 LAB
ALBUMIN UR-MCNC: NEGATIVE MG/DL
ANION GAP SERPL CALCULATED.3IONS-SCNC: 7 MMOL/L (ref 3–14)
APPEARANCE UR: CLEAR
BILIRUB UR QL STRIP: NEGATIVE
BUN SERPL-MCNC: 10 MG/DL (ref 7–30)
CALCIUM SERPL-MCNC: 9 MG/DL (ref 8.5–10.1)
CHLORIDE SERPL-SCNC: 106 MMOL/L (ref 94–109)
CO2 SERPL-SCNC: 28 MMOL/L (ref 20–32)
COLOR UR AUTO: YELLOW
CREAT SERPL-MCNC: 0.87 MG/DL (ref 0.52–1.04)
GFR SERPL CREATININE-BSD FRML MDRD: 76 ML/MIN/{1.73_M2}
GLUCOSE SERPL-MCNC: 93 MG/DL (ref 70–99)
GLUCOSE UR STRIP-MCNC: NEGATIVE MG/DL
HGB UR QL STRIP: ABNORMAL
KETONES UR STRIP-MCNC: NEGATIVE MG/DL
LEUKOCYTE ESTERASE UR QL STRIP: NEGATIVE
MUCOUS THREADS #/AREA URNS LPF: PRESENT /LPF
NITRATE UR QL: NEGATIVE
PH UR STRIP: 5 PH (ref 5–7)
POTASSIUM SERPL-SCNC: 4.3 MMOL/L (ref 3.4–5.3)
RBC #/AREA URNS AUTO: 1 /HPF (ref 0–2)
SODIUM SERPL-SCNC: 141 MMOL/L (ref 133–144)
SOURCE: ABNORMAL
SP GR UR STRIP: 1.02 (ref 1–1.03)
SQUAMOUS #/AREA URNS AUTO: 1 /HPF (ref 0–1)
UROBILINOGEN UR STRIP-MCNC: 0 MG/DL (ref 0–2)
WBC #/AREA URNS AUTO: <1 /HPF (ref 0–5)

## 2018-12-31 PROCEDURE — 36415 COLL VENOUS BLD VENIPUNCTURE: CPT | Performed by: OBSTETRICS & GYNECOLOGY

## 2018-12-31 PROCEDURE — 81001 URINALYSIS AUTO W/SCOPE: CPT | Performed by: OBSTETRICS & GYNECOLOGY

## 2018-12-31 PROCEDURE — 80048 BASIC METABOLIC PNL TOTAL CA: CPT | Performed by: OBSTETRICS & GYNECOLOGY

## 2018-12-31 NOTE — TELEPHONE ENCOUNTER
Patient had concerns of kidney issues and was wondering if Dr. Doss would be willing to order some labs for her.  Dr. Doss asked me to order a UA and a BMP.  Ordered and patient informed to go to lab.  Patient had no further questions.  Baldomero Elaine MA

## 2019-01-16 ENCOUNTER — OFFICE VISIT (OUTPATIENT)
Dept: FAMILY MEDICINE | Facility: CLINIC | Age: 54
End: 2019-01-16
Payer: COMMERCIAL

## 2019-01-16 VITALS
RESPIRATION RATE: 16 BRPM | WEIGHT: 175 LBS | OXYGEN SATURATION: 99 % | TEMPERATURE: 97.6 F | BODY MASS INDEX: 27.47 KG/M2 | HEART RATE: 72 BPM | DIASTOLIC BLOOD PRESSURE: 82 MMHG | HEIGHT: 67 IN | SYSTOLIC BLOOD PRESSURE: 120 MMHG

## 2019-01-16 DIAGNOSIS — Z00.00 ENCOUNTER FOR WELL ADULT EXAM WITHOUT ABNORMAL FINDINGS: Primary | ICD-10-CM

## 2019-01-16 PROCEDURE — 99396 PREV VISIT EST AGE 40-64: CPT | Performed by: OBSTETRICS & GYNECOLOGY

## 2019-01-16 ASSESSMENT — ANXIETY QUESTIONNAIRES
3. WORRYING TOO MUCH ABOUT DIFFERENT THINGS: NOT AT ALL
5. BEING SO RESTLESS THAT IT IS HARD TO SIT STILL: NOT AT ALL
2. NOT BEING ABLE TO STOP OR CONTROL WORRYING: NOT AT ALL
IF YOU CHECKED OFF ANY PROBLEMS ON THIS QUESTIONNAIRE, HOW DIFFICULT HAVE THESE PROBLEMS MADE IT FOR YOU TO DO YOUR WORK, TAKE CARE OF THINGS AT HOME, OR GET ALONG WITH OTHER PEOPLE: NOT DIFFICULT AT ALL
1. FEELING NERVOUS, ANXIOUS, OR ON EDGE: NOT AT ALL
6. BECOMING EASILY ANNOYED OR IRRITABLE: NOT AT ALL
7. FEELING AFRAID AS IF SOMETHING AWFUL MIGHT HAPPEN: NOT AT ALL
GAD7 TOTAL SCORE: 0

## 2019-01-16 ASSESSMENT — PAIN SCALES - GENERAL: PAINLEVEL: NO PAIN (0)

## 2019-01-16 ASSESSMENT — PATIENT HEALTH QUESTIONNAIRE - PHQ9
SUM OF ALL RESPONSES TO PHQ QUESTIONS 1-9: 0
5. POOR APPETITE OR OVEREATING: NOT AT ALL

## 2019-01-16 ASSESSMENT — MIFFLIN-ST. JEOR: SCORE: 1423.48

## 2019-01-16 NOTE — PROGRESS NOTES
Subjective:Claudette is here for yearly physical. Current concerns are: none        Past Medical History:   Diagnosis Date     ASCUS favoring benign 5/22/15     Other and unspecified uterine inertia, with delivery [birth year]      Current Outpatient Medications   Medication Sig Dispense Refill     multivitamin, therapeutic with minerals (MULTI-VITAMIN) TABS Take 1 tablet by mouth daily       lisinopril (PRINIVIL/ZESTRIL) 5 MG tablet TAKE ONE TABLET BY MOUTH EVERY DAY (Patient not taking: Reported on 2019) 30 tablet 3     valACYclovir (VALTREX) 500 MG tablet TAKE ONE TABLET BY MOUTH TWICE A DAY (Patient not taking: Reported on 2019) 14 tablet 6      Allergies   Allergen Reactions     Penicillins Rash      History   Smoking Status     Never Smoker   Smokeless Tobacco     Never Used      Past Surgical History:   Procedure Laterality Date     C PART REMV BLADDER,REIMPLNT URETER  age 6     COLONOSCOPY N/A 10/27/2015    Procedure: COMBINED COLONOSCOPY, SINGLE OR MULTIPLE BIOPSY/POLYPECTOMY BY BIOPSY;  Surgeon: Rohan Tirado MD;  Location:  GI     HC EXC BENIGN SKIN LESION TRUNK/ARM/LEG >4 CM  03/12/10    excision of left buttock scar     LAPAROSCOPIC CHOLECYSTECTOMY  2010    LAPAROSCOPIC CHOLECYSTECTOMY performed by EDER HURT at  OR      Social History     Tobacco Use     Smoking status: Never Smoker     Smokeless tobacco: Never Used   Substance Use Topics     Alcohol use: No     Comment: rare occ.     Drug use: No      Family History   Problem Relation Age of Onset     Hypertension Mother      Cerebrovascular Disease Maternal Grandfather      Heart Disease Maternal Grandmother         MI ,  at age 86     Osteoporosis Maternal Grandmother      Musculoskeletal Disorder Maternal Grandmother      Arthritis Maternal Grandmother         RA     Cancer Paternal Grandfather         Luekemia     Family History Negative Other        ROS: A 12 point review of systems is negative except for  "the following:  No concerns      Physical Exam: /82   Pulse 72   Temp 97.6  F (36.4  C) (Temporal)   Resp 16   Ht 1.689 m (5' 6.5\")   Wt 79.4 kg (175 lb)   SpO2 99%   Breastfeeding? No   BMI 27.82 kg/m    HEENT:    Sclerae and conjunctiva are normal.   Ear canals and TMs look normal.  Nasal mucosa is pink  - no polyps or masses seen.  sinuses are non tender to palpation.  Throat is unremarkable . Mucous membranes are moist.   Neck is supple, mobile, no adenopathy or masses palpable. The thyroid feels normal.   Normal range of motion noted.  Chest is clear to auscultation.  No wheezes, rales or rhonchi heard.  Cardiac exam is normal with s1, s2, no murmurs or adventitious sounds.Normal rate and rhythm is heard.   Abdomen is soft,  nondistended, No masses felt.No HSM. No guarding or rigidity or rebound   noted. Palpation reveals  no    tenderness   Normal bowel sounds heard.   Pelvic exam:My nurse Palak   was present to chaperone the exam.  The external genitalia appeared normal.    The vaginal vault was without bleeding  or   discharge   or odor.   The cervix was smooth     No vaginal support defects were noted,    Bimanual exam revealed a  5 week  sized uterus. It does not   descend   well in the vaginal vault.    No adnexal masses were felt.    There was no  cervical motion tenderness.    Exam was NOT   limited by the patient's body habitus.        The breast exam was declined.      We did discuss the option for an exam   and I suggested that she should be doing a self-breast exam   monthly and after the age of 40 a yearly mammogram   and she feels comfortable that this is sufficient.         Assessment/Plan:    The yearly exam is normal.          Additional Plan:Diet and rest and exercise discussed.      I have advised going for a 5 mile walk daily if possible       See labs and orders.    .Immunizations reviewed(TDAP, pneumovax, shingles vaccine,etc)and discussed-including advice for yearly flu " shot/tetanus update.       Vaccines were discussed and  declined        Calcium supplementation advised     Colonoscopy at age 50 -this was done    Mammogram  Is advised beginning at age 40-pt to be responsible for getting this done - in 6 months she is due again    DEXA is advised beginning at age 65      Labs done: see orders -we did these last week and basic panel was normal- lipids were ok in 2016   We discussed pap- hers have been normal and she is now in menopause- she will not need any more paps-  HRT discussed- she declined.    I advised the following exams with specialists:    1. Dental evaluation yearly    2. Dermatology evaluation for mole exam yearly    3. Ophthalmology exam yearly to check for glaucoma, etc      Living will was discussed.         Followup in 12   months, sooner if concerns arise.   NAHEED Doss MD(electronic signature)

## 2019-01-17 ASSESSMENT — ANXIETY QUESTIONNAIRES: GAD7 TOTAL SCORE: 0

## 2019-06-05 ENCOUNTER — TELEPHONE (OUTPATIENT)
Dept: FAMILY MEDICINE | Facility: CLINIC | Age: 54
End: 2019-06-05

## 2019-06-05 RX ORDER — PREDNISONE 20 MG/1
60 TABLET ORAL DAILY
Qty: 30 TABLET | Refills: 0 | COMMUNITY
Start: 2019-06-05 | End: 2023-06-07

## 2019-06-05 NOTE — TELEPHONE ENCOUNTER
Patient stops into clinic today with symptoms of poison ivy. It is swallow and warm to the touch. She is concerned that it is developing into cellulitis. Dr. Doss saw patient and states it looks like poison ivy and would like to prescribe her a course of prednisone. See order, called into the pharmacy. If this does not improve patient may need to start an antibiotic. Patient is in agreement with this plan.  Maty Arias, CMA

## 2019-06-06 RX ORDER — SULFAMETHOXAZOLE/TRIMETHOPRIM 800-160 MG
1 TABLET ORAL 2 TIMES DAILY
Qty: 20 TABLET | Refills: 0 | COMMUNITY
Start: 2019-06-06 | End: 2019-06-16

## 2019-06-06 NOTE — TELEPHONE ENCOUNTER
Patient's symptoms have not improved with the first day of prednisone use. Per Dr. Doss patient should start an antibiotic now. Patient is agreeing to this plan. She will stop antibiotic if she notices a rash develop.  Rx called into [facility] pharmacy per Dr. Doss, Bactrim DS bid #20.  Maty Arias CMA

## 2019-10-30 DIAGNOSIS — B00.9 HSV (HERPES SIMPLEX VIRUS) INFECTION: ICD-10-CM

## 2019-10-30 RX ORDER — VALACYCLOVIR HYDROCHLORIDE 500 MG/1
500 TABLET, FILM COATED ORAL 2 TIMES DAILY
Qty: 14 TABLET | Refills: 6 | Status: SHIPPED | OUTPATIENT
Start: 2019-10-30

## 2020-09-23 ENCOUNTER — HOSPITAL ENCOUNTER (OUTPATIENT)
Dept: MAMMOGRAPHY | Facility: CLINIC | Age: 55
Discharge: HOME | End: 2020-09-23
Attending: OBSTETRICS & GYNECOLOGY | Admitting: OBSTETRICS & GYNECOLOGY
Payer: COMMERCIAL

## 2020-09-23 DIAGNOSIS — Z12.31 SCREENING MAMMOGRAM FOR HIGH-RISK PATIENT: ICD-10-CM

## 2020-09-23 PROCEDURE — 77067 SCR MAMMO BI INCL CAD: CPT

## 2020-11-13 ENCOUNTER — VIRTUAL VISIT (OUTPATIENT)
Dept: FAMILY MEDICINE | Facility: OTHER | Age: 55
End: 2020-11-13

## 2020-11-13 NOTE — PROGRESS NOTES
"Date: 2020 12:13:16  Clinician: Nolan Harris  Clinician NPI: [id number]  Patient: Claudette Burris  Patient : 1965  Patient Address: [address] [place] [address]  Patient Phone: [phone]  Visit Protocol: URI  Patient Summary:  Claudette is a 55 year old ( : 1965 ) female who initiated a [facility] Visit for COVID-19 (Coronavirus) evaluation and screening. When asked the question \"Please sign me up to receive news, health information and promotions. \", Claudette responded \"Yes\".    Claudette states her symptoms started 1-2 days ago.   Her symptoms consist of myalgia, chills, a sore throat, a headache, and a cough.   Symptom details     Cough: Claudette coughs a few times an hour and her cough is more bothersome at night. Phlegm does not come into her throat when she coughs. She does not believe her cough is caused by post-nasal drip.     Sore throat: Claudette reports having mild throat pain (1-3 on a 10 point pain scale), does not have exudate on her tonsils, and can swallow liquids. The lymph nodes in her neck are not enlarged. A rash has not appeared on the skin since the sore throat started.     Headache: She states the headache is mild (1-3 on a 10 point pain scale).      Claudette denies having vomiting, rhinitis, facial pain or pressure, malaise, teeth pain, ageusia, diarrhea, ear pain, wheezing, fever, enlarged lymph nodes, nasal congestion, nausea, and anosmia. She also denies taking antibiotic medication in the past month and having recent facial or sinus surgery in the past 60 days. She is not experiencing dyspnea.   Precipitating events  Within the past week, Claudette has not been exposed to someone with strep throat. She has not recently been exposed to someone with influenza. Claudette has not been in close contact with any high risk individuals.   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 In the past 14 days, Claudette has worked or volunteered at a hospital or a clinic. 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   Claudette has not had a close contact with a laboratory-confirmed COVID-19 patient within 14 days of symptom onset.    Since December 2019, Claudette has not been tested for COVID-19 and has not had upper respiratory infection or influenza-like illness.   Pertinent medical history  Claudette does not get yeast infections when she takes antibiotics.   Claudette does not need a return to work/school note.   Weight: 165 lbs   Claudette does not smoke or use smokeless tobacco.   Weight: 165 lbs    MEDICATIONS: No current medications, ALLERGIES: Penicillins  Clinician Response:  Dear Claudette,   Your symptoms show that you may have coronavirus (COVID-19). This illness can cause fever, cough and trouble breathing. Many people get a mild case and get better on their own. Some people can get very sick.  What should I do?  We would like to test you for this virus.   1. Please call [phone] to schedule your visit. Explain that you were referred by [facility] to have a COVID-19 test. Be ready to share your [facility] number.  * If you need to schedule in [facility] please call [phone] or for [employer] employees please call [phone].  * If you need to schedule in the [place] area please call [phone]. [place] employees call [phone].  The following will serve as your written order for this COVID Test, ordered by me, for the indication of suspected COVID [Z20.828]: The test will be ordered in [employer], our electronic health record, after you are scheduled. It will show as ordered and authorized by Matthew Salter MD.  Order: COVID-19 (Coronavirus) PCR for SYMPTOMATIC testing from [facility].   2. When it's time for your COVID test:  Stay at least 6 feet away from  "others. (If someone will drive you to your test, stay in the backseat, as far away from the  as you can.)   Cover your mouth and nose with a mask, tissue or washcloth.  Go straight to the testing site. Don't make any stops on the way there or back.      3.Starting now: Stay home and away from others (self-isolate) until:   You've had no fever---and no medicine that reduces fever---for one full day (24 hours). And...   Your other symptoms have gotten better. For example, your cough or breathing has improved. And...   At least 10 days have passed since your symptoms started.       During this time, don't leave the house except for testing or medical care.   Stay in your own room, even for meals. Use your own bathroom if you can.   Stay away from others in your home. No hugging, kissing or shaking hands. No visitors.  Don't go to work, school or anywhere else.    Clean \"high touch\" surfaces often (doorknobs, counters, handles, etc.). Use a household cleaning spray or wipes. You'll find a full list of  on the EPA website: www.epa.gov/pesticide-registration/list-n-disinfectants-use-against-sars-cov-2.   Cover your mouth and nose with a mask, tissue or washcloth to avoid spreading germs.  Wash your hands and face often. Use soap and water.  Caregivers in these groups are at risk for severe illness due to COVID-19:  o People 65 years and older  o People who live in a nursing home or long-term care facility  o People with chronic disease (lung, heart, cancer, diabetes, kidney, liver, immunologic)  o People who have a weakened immune system, including those who:   Are in cancer treatment  Take medicine that weakens the immune system, such as corticosteroids  Had a bone marrow or organ transplant  Have an immune deficiency  Have poorly controlled HIV or AIDS  Are obese (body mass index of 40 or higher)  Smoke regularly   o Caregivers should wear gloves while washing dishes, handling laundry and cleaning bedrooms " and bathrooms.  o Use caution when washing and drying laundry: Don't shake dirty laundry, and use the warmest water setting that you can.  o For more tips, go to www.cdc.gov/coronavirus/2019-ncov/downloads/10Things.pdf.    4  How can I take care of myself?   Get lots of rest. Drink extra fluids (unless a doctor has told you not to).   Take Tylenol (acetaminophen) for fever or pain. If you have liver or kidney problems, ask your family doctor if it's okay to take Tylenol.   Adults can take either:    650 mg (two 325 mg pills) every 4 to 6 hours, or...   1,000 mg (two 500 mg pills) every 8 hours as needed.    Note: Don't take more than 3,000 mg in one day. Acetaminophen is found in many medicines (both prescribed and over-the-counter medicines). Read all labels to be sure you don't take too much.   For children, check the Tylenol bottle for the right dose. The dose is based on the child's age or weight.    If you have other health problems (like cancer, heart failure, an organ transplant or severe kidney disease): Call your specialty clinic if you don't feel better in the next 2 days.       Know when to call 911. Emergency warning signs include:    Trouble breathing or shortness of breath Pain or pressure in the chest that doesn't go away Feeling confused like you haven't felt before, or not being able to wake up Bluish-colored lips or face.  Where can I get more information?   [facility] -- About COVID-19: [email or web address]   CDC -- What to Do If You're Sick: www.cdc.gov/coronavirus/2019-ncov/about/steps-when-sick.html   CDC -- Ending Home Isolation: www.cdc.gov/coronavirus/2019-ncov/hcp/disposition-in-home-patients.html   CDC -- Caring for Someone: www.cdc.gov/coronavirus/2019-ncov/if-you-are-sick/care-for-someone.html   [facility] -- Interim Guidance for Hospital Discharge to Home: www.health.[facility]./diseases/coronavirus/hcp/hospdischarge.pdf   [facility] clinical trials (COVID-19  research studies): clinicalaffairs.[email or web address]-clinical-trials    Below are the COVID-19 hotlines at the [place] Department of [facility] ([facility]). Interpreters are available.    For health questions: Call 852-546-3029 or 1-799.783.8082 (7 a.m. to 7 p.m.) For questions about schools and childcare: Call 325-715-3114 or 1-811.784.1090 (7 a.m. to 7 p.m.)    Diagnosis: Contact with and (suspected) exposure to other viral communicable diseases  Diagnosis ICD: Z20.828

## 2020-11-14 ENCOUNTER — AMBULATORY - HEALTHEAST (OUTPATIENT)
Dept: FAMILY MEDICINE | Facility: CLINIC | Age: 55
End: 2020-11-14

## 2020-11-14 DIAGNOSIS — Z20.822 SUSPECTED COVID-19 VIRUS INFECTION: ICD-10-CM

## 2020-11-15 ENCOUNTER — AMBULATORY - HEALTHEAST (OUTPATIENT)
Dept: FAMILY MEDICINE | Facility: CLINIC | Age: 55
End: 2020-11-15

## 2020-11-15 DIAGNOSIS — Z20.822 SUSPECTED COVID-19 VIRUS INFECTION: ICD-10-CM

## 2020-11-17 ENCOUNTER — COMMUNICATION - HEALTHEAST (OUTPATIENT)
Dept: SCHEDULING | Facility: CLINIC | Age: 55
End: 2020-11-17

## 2021-01-06 ENCOUNTER — OFFICE VISIT (OUTPATIENT)
Dept: CARDIOLOGY | Facility: CLINIC | Age: 56
End: 2021-01-06
Payer: COMMERCIAL

## 2021-01-06 ENCOUNTER — HOSPITAL ENCOUNTER (OUTPATIENT)
Dept: CARDIOLOGY | Facility: CLINIC | Age: 56
Discharge: HOME | End: 2021-01-06
Attending: INTERNAL MEDICINE | Admitting: INTERNAL MEDICINE
Payer: COMMERCIAL

## 2021-01-06 VITALS
WEIGHT: 165.5 LBS | HEART RATE: 68 BPM | BODY MASS INDEX: 25.98 KG/M2 | HEIGHT: 67 IN | SYSTOLIC BLOOD PRESSURE: 138 MMHG | DIASTOLIC BLOOD PRESSURE: 92 MMHG | OXYGEN SATURATION: 100 %

## 2021-01-06 DIAGNOSIS — I10 BENIGN ESSENTIAL HYPERTENSION: Primary | ICD-10-CM

## 2021-01-06 DIAGNOSIS — Z82.49 FAMILY HISTORY OF ISCHEMIC HEART DISEASE: ICD-10-CM

## 2021-01-06 PROCEDURE — 93005 ELECTROCARDIOGRAM TRACING: CPT

## 2021-01-06 PROCEDURE — 93010 ELECTROCARDIOGRAM REPORT: CPT | Performed by: INTERNAL MEDICINE

## 2021-01-06 PROCEDURE — 99204 OFFICE O/P NEW MOD 45 MIN: CPT | Performed by: INTERNAL MEDICINE

## 2021-01-06 RX ORDER — LISINOPRIL 5 MG/1
5 TABLET ORAL DAILY
Qty: 30 TABLET | Refills: 11 | Status: SHIPPED | OUTPATIENT
Start: 2021-01-06 | End: 2024-01-24

## 2021-01-06 ASSESSMENT — MIFFLIN-ST. JEOR: SCORE: 1370.39

## 2021-01-06 NOTE — PATIENT INSTRUCTIONS
"-start lisinopril 5 mg daily  -Lab work in one week, fasting (lipids and BMP)  -Schedule coronary calcium score at [facility] (alternatively through another system \"heart scan\")  -Follow up in one month with Dr. Norman   -establish with new primary MD  "

## 2021-01-06 NOTE — LETTER
1/6/2021    Chris Doss MD  [address] [place] [address]    RE: Claudette ROMO Fatuma       Dear Colleague,    I had the pleasure of seeing Claudette Burris in the [facility].    CARDIOLOGY CONSULT    REASON FOR CONSULT: hypertension    PRIMARY CARE PHYSICIAN:  Chris Doss    HISTORY OF PRESENT ILLNESS:  Ms. Burris is a 54 y/o woman with PMH significant for hypertension who presents for evaluation of the same.    Ms. Burris reports a history of hypertension and has been on lisinopril 5 mg in the past.  She states at some point, she was told she no longer needed it.  Recently, she noted feeling poorly with tingling in both arms occurring at random.  She has had her BP checked here at the cardiology clinic and it has been elevated to the 160's systolic.  She presents today for further evaluation. She denies any exertional chest pain, chest discomfort or shortness of breath.  She is concerned about the possibility of heart disease given her family history.        PAST MEDICAL HISTORY:  1.  Hypertension    MEDICATIONS:  Current Outpatient Medications   Medication     lisinopril (PRINIVIL/ZESTRIL) 5 MG tablet     multivitamin, therapeutic with minerals (MULTI-VITAMIN) TABS     predniSONE (DELTASONE) 20 MG tablet     valACYclovir (VALTREX) 500 MG tablet     No current facility-administered medications for this visit.        ALLERGIES:  Allergies   Allergen Reactions     Penicillins Rash       SOCIAL HISTORY:  I have reviewed this patient's social history and updated it with pertinent information if needed. Claudette Burris  reports that she has never smoked. She has never used smokeless tobacco. She reports that she does not drink alcohol or use drugs.    FAMILY HISTORY:  I have reviewed this patient's family history and updated it with pertinent information if needed.   Family History   Problem Relation Age of Onset     Hypertension Mother       Cerebrovascular Disease Maternal Grandfather      Heart Disease Maternal Grandmother         MI ,  at age 86     Osteoporosis Maternal Grandmother      Musculoskeletal Disorder Maternal Grandmother      Arthritis Maternal Grandmother         RA     Cancer Paternal Grandfather         Luekemia     Family History Negative Other        REVIEW OF SYSTEMS:  A complete ROS was obtained and the pertinent positives are outlined in the history of present illness above.  The remainder of systems is negative.    PHYSICAL EXAM:      BP: (!) 138/92 Pulse: 68     SpO2: 100 %      Vital Signs with Ranges  Pulse:  [68] 68  BP: (138-142)/(92-98) 138/92  SpO2:  [100 %] 100 %  165 lbs 8 oz    Constitutional: awake, alert, no distress  Eyes: PERRL, sclera nonicteric  ENT: trachea midline  Respiratory: CTAB  Cardiovascular: RRR no m/r/g, no JVD  GI: nondistended, nontender, bowel sounds present  Lymph/Hematologic: no lymphadenopathy  Skin: dry, no rash  Musculoskeletal: good muscle tone, no edema bilaterally  Neurologic: no focal deficits  Neuropsychiatric: appropriate affact    DATA:  Labs:   None current    EKG:   Dated 2021 reviewed personally.  Normal sinus rhythm, poor R wave progression, no diagnostic ST segment changes      ASSESSMENT:  1.  Hypertension:  Previously on lisinopril and tolerated this without difficulty.   2.  Family hx of CAD    RECOMMENDATIONS:  1. Start lisinopril 5 ,g daily  2.  Coronary artery calcium score for further risk stratification  3.  Fasting lipids and BMP in one week  4.  Follow up in one months for further titration of meds as needed.      Sara Norman MD  Cardiology - [facility]  2021      Thank you for allowing me to participate in the care of your patient.    Sincerely,     Sara Norman MD     [facility]

## 2021-01-06 NOTE — PROGRESS NOTES
CARDIOLOGY CONSULT    REASON FOR CONSULT: hypertension    PRIMARY CARE PHYSICIAN:  Chris Doss    HISTORY OF PRESENT ILLNESS:  Ms. Burris is a 56 y/o woman with PMH significant for hypertension who presents for evaluation of the same.    Ms. Burris reports a history of hypertension and has been on lisinopril 5 mg in the past.  She states at some point, she was told she no longer needed it.  Recently, she noted feeling poorly with tingling in both arms occurring at random.  She has had her BP checked here at the cardiology clinic and it has been elevated to the 160's systolic.  She presents today for further evaluation. She denies any exertional chest pain, chest discomfort or shortness of breath.  She is concerned about the possibility of heart disease given her family history.        PAST MEDICAL HISTORY:  1.  Hypertension    MEDICATIONS:  Current Outpatient Medications   Medication     lisinopril (PRINIVIL/ZESTRIL) 5 MG tablet     multivitamin, therapeutic with minerals (MULTI-VITAMIN) TABS     predniSONE (DELTASONE) 20 MG tablet     valACYclovir (VALTREX) 500 MG tablet     No current facility-administered medications for this visit.        ALLERGIES:  Allergies   Allergen Reactions     Penicillins Rash       SOCIAL HISTORY:  I have reviewed this patient's social history and updated it with pertinent information if needed. Claudette Burris  reports that she has never smoked. She has never used smokeless tobacco. She reports that she does not drink alcohol or use drugs.    FAMILY HISTORY:  I have reviewed this patient's family history and updated it with pertinent information if needed.   Family History   Problem Relation Age of Onset     Hypertension Mother      Cerebrovascular Disease Maternal Grandfather      Heart Disease Maternal Grandmother         MI ,  at age 86     Osteoporosis Maternal Grandmother      Musculoskeletal Disorder Maternal Grandmother      Arthritis Maternal  Grandmother         RA     Cancer Paternal Grandfather         Demetra     Family History Negative Other        REVIEW OF SYSTEMS:  A complete ROS was obtained and the pertinent positives are outlined in the history of present illness above.  The remainder of systems is negative.    PHYSICAL EXAM:      BP: (!) 138/92 Pulse: 68     SpO2: 100 %      Vital Signs with Ranges  Pulse:  [68] 68  BP: (138-142)/(92-98) 138/92  SpO2:  [100 %] 100 %  165 lbs 8 oz    Constitutional: awake, alert, no distress  Eyes: PERRL, sclera nonicteric  ENT: trachea midline  Respiratory: CTAB  Cardiovascular: RRR no m/r/g, no JVD  GI: nondistended, nontender, bowel sounds present  Lymph/Hematologic: no lymphadenopathy  Skin: dry, no rash  Musculoskeletal: good muscle tone, no edema bilaterally  Neurologic: no focal deficits  Neuropsychiatric: appropriate affact    DATA:  Labs:   None current    EKG:   Dated 1/6/2021 reviewed personally.  Normal sinus rhythm, poor R wave progression, no diagnostic ST segment changes      ASSESSMENT:  1.  Hypertension:  Previously on lisinopril and tolerated this without difficulty.   2.  Family hx of CAD    RECOMMENDATIONS:  1. Start lisinopril 5 ,g daily  2.  Coronary artery calcium score for further risk stratification  3.  Fasting lipids and BMP in one week  4.  Follow up in one months for further titration of meds as needed.      Sara Norman MD  Cardiology - [facility]  January 6, 2021

## 2021-01-10 ENCOUNTER — HEALTH MAINTENANCE LETTER (OUTPATIENT)
Age: 56
End: 2021-01-10

## 2021-01-14 DIAGNOSIS — I10 BENIGN ESSENTIAL HYPERTENSION: ICD-10-CM

## 2021-01-14 LAB
ANION GAP SERPL CALCULATED.3IONS-SCNC: 3 MMOL/L (ref 3–14)
BUN SERPL-MCNC: 10 MG/DL (ref 7–30)
CALCIUM SERPL-MCNC: 9.2 MG/DL (ref 8.5–10.1)
CHLORIDE SERPL-SCNC: 110 MMOL/L (ref 94–109)
CHOLEST SERPL-MCNC: 227 MG/DL
CO2 SERPL-SCNC: 30 MMOL/L (ref 20–32)
CREAT SERPL-MCNC: 0.75 MG/DL (ref 0.52–1.04)
GFR SERPL CREATININE-BSD FRML MDRD: 90 ML/MIN/{1.73_M2}
GLUCOSE SERPL-MCNC: 96 MG/DL (ref 70–99)
HDLC SERPL-MCNC: 62 MG/DL
LDLC SERPL CALC-MCNC: 143 MG/DL
NONHDLC SERPL-MCNC: 165 MG/DL
POTASSIUM SERPL-SCNC: 4 MMOL/L (ref 3.4–5.3)
SODIUM SERPL-SCNC: 143 MMOL/L (ref 133–144)
TRIGL SERPL-MCNC: 108 MG/DL

## 2021-01-14 PROCEDURE — 36415 COLL VENOUS BLD VENIPUNCTURE: CPT | Performed by: INTERNAL MEDICINE

## 2021-01-14 PROCEDURE — 80061 LIPID PANEL: CPT | Performed by: INTERNAL MEDICINE

## 2021-01-14 PROCEDURE — 80048 BASIC METABOLIC PNL TOTAL CA: CPT | Performed by: INTERNAL MEDICINE

## 2021-01-22 ENCOUNTER — HOSPITAL ENCOUNTER (OUTPATIENT)
Dept: CT IMAGING | Facility: CLINIC | Age: 56
Discharge: HOME | End: 2021-01-22
Attending: INTERNAL MEDICINE | Admitting: INTERNAL MEDICINE
Payer: COMMERCIAL

## 2021-01-22 DIAGNOSIS — Z82.49 FAMILY HISTORY OF ISCHEMIC HEART DISEASE: ICD-10-CM

## 2021-01-22 PROCEDURE — 75571 CT HRT W/O DYE W/CA TEST: CPT | Mod: 26 | Performed by: INTERNAL MEDICINE

## 2021-01-22 PROCEDURE — 75571 CT HRT W/O DYE W/CA TEST: CPT

## 2021-04-14 ENCOUNTER — TELEPHONE (OUTPATIENT)
Dept: CARDIOLOGY | Facility: CLINIC | Age: 56
End: 2021-04-14

## 2021-04-14 NOTE — TELEPHONE ENCOUNTER
Patient called stating she had an RN at work take her BP a few times and it was running about 147/98. Pt started taking 3 tablets of lisinopril to total 10 mg daily. Just started this 4/9/21. Pt stated she can have a nurse at work check it again tomorrow or Friday. Pt also has PMD follow up scheduled for 4/19/21. Pt was wondering if Dr. Norman would be ok sending in new prescription for Lisinopril 10 mg as she will run out quicker now. Pt also wondering when Dr. Norman would like to see her back.     Lipids reviewed:  LDL is above goal.  HDL (good cholesterol) is excellent. Will wait for calcium score to determine management priority-either statin + lifestyle modifications versus lifestyle modifications alone.     Sara Norman MD      HDL 62   Ca score: 0    Last saw Dr. Norman 1/6/21   ASSESSMENT:  1.  Hypertension:  Previously on lisinopril and tolerated this without difficulty.   2.  Family hx of CAD     RECOMMENDATIONS:  1. Start lisinopril 5 ,g daily  2.  Coronary artery calcium score for further risk stratification  3.  Fasting lipids and BMP in one week  4.  Follow up in one months for further titration of meds as needed.      Will route to Dr. Norman to review

## 2021-04-14 NOTE — TELEPHONE ENCOUNTER
RN called patient and updated her with Dr. Norman's recommendations. Patient verbalized understanding and has no further questions at this time. Patient will f/u with PMD.        Dr. Norman's response    Okay to follow up with primary MD.  She can follow up with cardiology as needed.     Sara Norman MD

## 2021-04-19 ENCOUNTER — HOSPITAL ENCOUNTER (OUTPATIENT)
Dept: ULTRASOUND IMAGING | Facility: CLINIC | Age: 56
Discharge: HOME | End: 2021-04-19
Attending: OBSTETRICS & GYNECOLOGY | Admitting: OBSTETRICS & GYNECOLOGY
Payer: COMMERCIAL

## 2021-04-19 ENCOUNTER — OFFICE VISIT (OUTPATIENT)
Dept: FAMILY MEDICINE | Facility: CLINIC | Age: 56
End: 2021-04-19
Payer: COMMERCIAL

## 2021-04-19 VITALS
RESPIRATION RATE: 16 BRPM | HEIGHT: 66 IN | DIASTOLIC BLOOD PRESSURE: 78 MMHG | BODY MASS INDEX: 26.68 KG/M2 | SYSTOLIC BLOOD PRESSURE: 116 MMHG | OXYGEN SATURATION: 99 % | TEMPERATURE: 97.3 F | HEART RATE: 77 BPM | WEIGHT: 166 LBS

## 2021-04-19 DIAGNOSIS — Z00.00 ROUTINE GENERAL MEDICAL EXAMINATION AT A HEALTH CARE FACILITY: Primary | ICD-10-CM

## 2021-04-19 DIAGNOSIS — Z83.2 FAMILY HISTORY OF FACTOR V LEIDEN MUTATION: ICD-10-CM

## 2021-04-19 DIAGNOSIS — N83.202 CYST OF LEFT OVARY: ICD-10-CM

## 2021-04-19 DIAGNOSIS — I10 BENIGN ESSENTIAL HYPERTENSION: ICD-10-CM

## 2021-04-19 DIAGNOSIS — E78.5 HYPERLIPIDEMIA LDL GOAL <100: ICD-10-CM

## 2021-04-19 PROCEDURE — G0124 SCREEN C/V THIN LAYER BY MD: HCPCS

## 2021-04-19 PROCEDURE — 36415 COLL VENOUS BLD VENIPUNCTURE: CPT | Performed by: OBSTETRICS & GYNECOLOGY

## 2021-04-19 PROCEDURE — 76830 TRANSVAGINAL US NON-OB: CPT

## 2021-04-19 PROCEDURE — 81241 F5 GENE: CPT | Performed by: OBSTETRICS & GYNECOLOGY

## 2021-04-19 PROCEDURE — 87624 HPV HI-RISK TYP POOLED RSLT: CPT | Performed by: OBSTETRICS & GYNECOLOGY

## 2021-04-19 PROCEDURE — 99396 PREV VISIT EST AGE 40-64: CPT | Performed by: OBSTETRICS & GYNECOLOGY

## 2021-04-19 PROCEDURE — 99213 OFFICE O/P EST LOW 20 MIN: CPT | Mod: 25 | Performed by: OBSTETRICS & GYNECOLOGY

## 2021-04-19 PROCEDURE — G0452 MOLECULAR PATHOLOGY INTERPR: HCPCS | Performed by: OBSTETRICS & GYNECOLOGY

## 2021-04-19 PROCEDURE — G0145 SCR C/V CYTO,THINLAYER,RESCR: HCPCS | Performed by: OBSTETRICS & GYNECOLOGY

## 2021-04-19 RX ORDER — LISINOPRIL 5 MG/1
5 TABLET ORAL 2 TIMES DAILY
Qty: 180 TABLET | Refills: 3 | Status: SHIPPED | OUTPATIENT
Start: 2021-04-19 | End: 2022-05-11

## 2021-04-19 ASSESSMENT — MIFFLIN-ST. JEOR: SCORE: 1359.72

## 2021-04-19 ASSESSMENT — PAIN SCALES - GENERAL: PAINLEVEL: NO PAIN (0)

## 2021-04-19 NOTE — PROGRESS NOTES
SUBJECTIVE:   CC: Claudette Burris is an 56 year old woman who presents for preventive health visit.       Patient has been advised of split billing requirements and indicates understanding: Yes  Healthy Habits:    Do you get at least three servings of calcium containing foods daily (dairy, green leafy vegetables, etc.)? yes    Amount of exercise or daily activities, outside of work: 7 day(s) per week    Problems taking medications regularly No    Medication side effects: Yes dry cough    Have you had an eye exam in the past two years? yes    Do you see a dentist twice per year? yes    Do you have sleep apnea, excessive snoring or daytime drowsiness?no      1) would like to discuss the lisinopril    Subjective:Claudette is here for yearly physical. Current concerns are: She recently increased her lisinopril to twice daily and it seems to be helping control her blood pressure better.    2.  She was recently told by her cardiologist that she has high cholesterol.  she wants to discuss management.  She wants to remain conservative if possible.        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      Current Outpatient Medications   Medication Sig Dispense Refill     lisinopril (ZESTRIL) 5 MG tablet Take 1 tablet (5 mg) by mouth daily 30 tablet 11     multivitamin, therapeutic with minerals (MULTI-VITAMIN) TABS Take 1 tablet by mouth daily       predniSONE (DELTASONE) 20 MG tablet Take 3 tablets (60 mg) by mouth daily 30 tablet 0     valACYclovir (VALTREX) 500 MG tablet Take 1 tablet (500 mg) by mouth 2 times daily (Patient not taking: Reported on 1/6/2021) 14 tablet 6      Allergies   Allergen Reactions     Penicillins Rash      History   Smoking Status     Never Smoker   Smokeless Tobacco     Never Used      Past Surgical History:   Procedure Laterality Date     C PART REMV BLADDER,REIMPLNT URETER  age 6     COLONOSCOPY  "N/A 10/27/2015    Procedure: COMBINED COLONOSCOPY, SINGLE OR MULTIPLE BIOPSY/POLYPECTOMY BY BIOPSY;  Surgeon: Rohan Tirado MD;  Location:  GI     HC EXC BENIGN SKIN LESION TRUNK/ARM/LEG >4 CM  03/12/10    excision of left buttock scar     LAPAROSCOPIC CHOLECYSTECTOMY  2010    LAPAROSCOPIC CHOLECYSTECTOMY performed by EDER HURT at  OR      Social History     Tobacco Use     Smoking status: Never Smoker     Smokeless tobacco: Never Used   Substance Use Topics     Alcohol use: No     Comment: rare occ.     Drug use: No      Family History   Problem Relation Age of Onset     Hypertension Mother      Cerebrovascular Disease Maternal Grandfather      Heart Disease Maternal Grandmother         MI ,  at age 86     Osteoporosis Maternal Grandmother      Musculoskeletal Disorder Maternal Grandmother      Arthritis Maternal Grandmother         RA     Cancer Paternal Grandfather         Luekemia     Family History Negative Other        ROS: A 12 point review of systems is negative except for the following:  See abov3      Physical Exam: /78 (Cuff Size: Adult Regular)   Pulse 77   Temp 97.3  F (36.3  C) (Temporal)   Resp 16   Ht 1.676 m (5' 6\")   Wt 75.3 kg (166 lb)   SpO2 99%   BMI 26.79 kg/m    HEENT:    Sclerae and conjunctiva are normal.   Ear canals and TMs look normal.  Nasal mucosa is pink  - no polyps or masses seen.  Throat is unremarkable . Mucous membranes are moist.   Neck is supple, mobile, no adenopathy or masses palpable. The thyroid feels normal.   Normal range of motion noted.  Chest is clear to auscultation.  No wheezes, rales or rhonchi heard.  Cardiac exam is normal with s1, s2, no murmurs or adventitious sounds.Normal rate and rhythm is heard.   Abdomen is soft,  nondistended, No masses felt.No HSM. No guarding or rigidity or rebound   noted. Palpation reveals  no    tenderness   Normal bowel sounds heard.   Pelvic exam:My nurse Loretta was present to " chaperone the exam.  The external genitalia appeared normal.    The vaginal vault was without bleeding  or   discharge   or odor.   The cervix was smooth   and shiny and normal in appearance.    A pap was obtained.    No vaginal support defects were noted,    Bimanual exam revealed a 6 week   sized uterus. It does not   descend   well in the vaginal vault.    No adnexal masses were felt.    There was no  cervical motion tenderness.    Exam was  limited by the patient's body habitus.            The breast exam was declined.      We did discuss the option for an exam   and I suggested that she should be doing a self-breast exam   monthly and after the age of 40 a yearly mammogram   and she feels comfortable that this is sufficient.           Assessment/Plan:    The yearly exam is normal except for:    1.  Benign essential hypertension-she has recently increased the lisinopril to 5 mg twice daily and this seems to be working better.   Basic panel was done 3 months ago and it was normal.    2.  My pelvic exam today revealed a fullness in the left adnexa possibly consistent with a left ovarian cyst.  I will order an ultrasound to check this.    3.  Her lipid level is slightly elevated.  She spoke with Dr. Uriostegui about this and since her calcium score was normal on her recent CT angiogram she has opted for lifestyle modification.  I discussed low-cholesterol diet.  Avoid eggs etc.  Read the labels.    4.  Positive family history for factor V Leiden mutation-she wants blood testing for this today.  She takes a baby aspirin daily and I encouraged her to continue this.    Additional Plan:Diet and rest and exercise discussed.      I have advised going for a 5 mile walk daily if possible       See labs and orders.    .Immunizations reviewed(TDAP, pneumovax, shingles vaccine,etc)and discussed-including advice for yearly flu shot/tetanus update.     The following vaccines given today:   she declined any.    Hepatitis C and HIV  testing offered    these tests were declined.     Calcium supplementation advised     Colonoscopy was done at age 50      Mammogram  Is advised beginning at age 40-pt to be responsible for getting this done     DEXA is advised beginning at age 65      Labs done: see orders      I advised the following exams with specialists:    1. Dental evaluation yearly    2. Dermatology evaluation for mole exam yearly    3. Ophthalmology exam yearly to check for glaucoma, etc          Living will was discussed.         Followup in 12   months, sooner if concerns arise.   NAHEED Doss MD(electronic signature)

## 2021-04-19 NOTE — LETTER
April 26, 2021      Claudette Burris  [address] [facility] [id number]        Dear ,    We are writing to inform you of your test results.    result(s) is/are normal.  Thanks. NAHEED Doss MD     Resulted Orders   Factor 5 leiden mutation analysis   Result Value Ref Range    Copath Report       Patient Name: CLAUDETTE BURRIS  MR#: [id number]  Specimen #: [id number]  Collected: 4/19/2021 11:33  Received: 4/20/2021 11:31  Reported: 4/22/2021 15:22  Ordering Phy(s): VISHAL DOSS    For improved result formatting, select 'View Enhanced Report Format' under   Linked Documents section.  _________________________________________    TEST(S) REQUESTED:  Factor 5 Leiden Mutation by PCR    SPECIMEN DESCRIPTION:  Blood    RESULTS:    Factor V 1691G>A (Leiden)  RESULTS:  Mutation analyzed:     1691G>A  Factor V 1691G>A (Leiden)  Interpretation:      ABSENT  Factor V 1691G>A (Leiden) mutation  genotype:      G/G    INTERPRETATION:  The patient is negative for the Factor V 1691G>A (Leiden) mutation.    METHODOLOGY:   The regions of genomic DNA containing the N8623N Factor V   Leiden gene mutation (Factor V  Leiden) and the Factor 2(Prothrombin X27704V) gene mutation were   simultaneously amplified using the polymerase  chain reaction.  The amplified products were digested with  restriction   endonuclease TaqI and products were  analyzed by gel electrophoresis.    COMMENTS:  If a patient is the recipient of an allogeneic bone marrow transplant,   this test must be done on a  pre-transplant sample or buccal swab.  A previous allogeneic bone marrow   transplant will interfere with test  results.  Call the [facility]([phone]) for   instructions on sample collection for these  patients.    This test was developed and its performance characteristics determined by   [facility]. It has not been cleared or approved by the FDA.   The laboratory is  regulated under CLIA  as qualified to perform high-complexity testing. This test is used for   clinical purposes. It should not be  regarded as investigational or for research.    A resident/fellow in an accredited training program was involved in the   selection of testing, review of  laboratory data, and/or interpretation of this case.  I, as the senior   physici brooke, attest that I: (i) confirmed  appropriate testing, (ii) examined the relevant raw data for the   specimen(s); and (iii) rendered or confirmed  the interpretation(s).    Electronically Signed Out By:  TOMASA Tom    CPT Codes:  A: 38883-O5ISR, -YLLLLM    TESTING LAB LOCATION:  [address] [address] [phone]    COLLECTION SITE:  Client:  [facility]  Location:  [facility] (P)         If you have any questions or concerns, please call the clinic at the number listed above.

## 2021-04-19 NOTE — LETTER
April 20, 2021      Claudette Burris  [address] [place] [id number]        Dear ,    We are writing to inform you of your test results.     The right ovary was not seen but the left ovary looked entirely normal.  It is as I suspected.  I think the right ovary is probably smaller.  Otherwise everything looked okay.  Small calcium deposits in the uterus are fairly common.  Thanks. NAHEED Doss MD     Resulted Orders   US Pelvic Complete with Transvaginal    Narrative    ULTRASOUND PELVIC COMPLETE WITH TRANSVAGINAL April 19, 2021 11:25 AM    CLINICAL HISTORY: Cyst of left ovary. Patient has left-sided fullness.    TECHNIQUE: Transabdominal scans were performed. Endovaginal ultrasound  was performed to better visualize the adnexa.    COMPARISON: Pelvic ultrasound dated 10/28/2015.    FINDINGS: Large amount of bowel gas limits evaluation.    UTERUS: 6.1 x 4.4 x 2.7 cm. Small focal hyperechoic area in the  posterior uterine myometrial body adjacent to the endometrium measure  0.2 x 0.2 x 0.1 cm and likely represents dystrophic calcification,  possibly from prior childbirth. Uterus is otherwise of normal  morphology and echogenicity.    ENDOMETRIUM: 1 mm. Endometrial stripe appears grossly within normal  limits.    RIGHT OVARY: Completely obscured by bowel gas and could not be  evaluated.    LEFT OVARY: 1.5 x 0.9 x 1.2 cm. Normal with flow demonstrated.    No significant free fluid.      Impression    IMPRESSION:  1.  Probable small dystrophic calcification posterior upper uterine  body.  2.  Right ovary is completely obscured by bowel gas and could not be  evaluated.  3.  Otherwise negative pelvic ultrasound.      TAO FOY MD       If you have any questions or concerns, please call the clinic at the number listed above.       Sincerely,      Chris Doss MD

## 2021-04-20 NOTE — RESULT ENCOUNTER NOTE
Maty/team,Please inform Claudette/ or caretaker  that this result(s) is/are normal.  The right ovary was not seen but the left ovary looked entirely normal.  It is as I suspected.  I think the right ovary is probably smaller.  Otherwise everything looked okay.  Small calcium deposits in the uterus are fairly common.  Thanks. NAHEED Doss MD

## 2021-04-22 LAB
COPATH REPORT: ABNORMAL
COPATH REPORT: NORMAL
PAP: ABNORMAL

## 2021-04-24 NOTE — RESULT ENCOUNTER NOTE
Maty/team,Please inform Claudette/ or caretaker  that this result(s) is/are normal.  Thanks. NAHEED Doss MD

## 2021-04-26 LAB
FINAL DIAGNOSIS: NORMAL
HPV HR 12 DNA CVX QL NAA+PROBE: NEGATIVE
HPV16 DNA SPEC QL NAA+PROBE: NEGATIVE
HPV18 DNA SPEC QL NAA+PROBE: NEGATIVE
SPECIMEN DESCRIPTION: NORMAL
SPECIMEN SOURCE CVX/VAG CYTO: NORMAL

## 2021-04-27 ENCOUNTER — PATIENT OUTREACH (OUTPATIENT)
Dept: FAMILY MEDICINE | Facility: CLINIC | Age: 56
End: 2021-04-27

## 2021-06-13 NOTE — TELEPHONE ENCOUNTER
"Coronavirus (COVID-19) Notification    Caller Name (Patient, parent, daughter/son, grandparent, etc)  Patient: Polly Burris    Reason for call  Notify of Positive Coronavirus (COVID-19) lab results, assess symptoms,  review  [facility] recommendations    Lab Result    Lab test:  2019-nCoV rRt-PCR or SARS-CoV-2 PCR    Oropharyngeal AND/OR nasopharyngeal swabs is POSITIVE for 2019-nCoV RNA/SARS-COV-2 PCR (COVID-19 virus)    RN Recommendations/Instructions per [facility] Coronavirus COVID-19 recommendations    Brief introduction script  Introduce self and then review script:  \"I am calling on behalf of [employer].  We were notified that your Coronavirus test (COVID-19) for was POSITIVE for the virus.  I have some information to relay to you but first I wanted to mention that the [place] Dept of Health will be contacting you shortly [it's possible MD already called Patient] to talk to you more about how you are feeling and other people you have had contact with who might now also have the virus.  Also, [facility] is Partnering with the [facility] for Covid-19 research, you may be contacted directly by research staff.\"    ssessment (Inquire about Patient's current symptoms)   Assessment   Current Symptoms at time of phone call: (if no symptoms, document No symptoms] Nasal congestion   Symptom onset (if applicable) 11/12/2020     If at time of call, Patients symptoms hare worsened, the Patient should contact 911 or have someone drive them to Emergency Dept promptly:      If Patient calling 911, inform 911 personal that you have tested positive for the Coronavirus (COVID-19).  Place mask on and await 911 to arrive.    If Emergency Dept, If possible, please have another adult drive you to the Emergency Dept but you need to wear mask when in contact with other people.      Review information with Patient    Your result was positive. This means you have COVID-19 (coronavirus).  We have sent you a " letter that reviews the information that I'll be reviewing with you now.    How can I protect others?    If you have symptoms: stay home and away from others (self-isolate) until:    You've had no fever--and no medicine that reduces fever--for 1 full day (24 hours). And      Your other symptoms have gotten better. For example, your cough or breathing has improved. And     At least 10 days have passed since your symptoms started. (If you ve been told by a doctor that you have a weak immune system, wait 20 days.)     If you don't have symptoms: Stay home and away from others (self-isolate) until at least 10 days have passed since your first positive COVID-19 test. (Date test collected).    During this time:    Stay in your own room, including for meals. Use your own bathroom if you can.    Stay away from others in your home. No hugging, kissing or shaking hands. No visitors.     Don't go to work, school or anywhere else.     Clean  high touch  surfaces often (doorknobs, counters, handles, etc.). Use a household cleaning spray or wipes. You'll find a full list on the EPA website at www.epa.gov/pesticide-registration/list-n-disinfectants-use-against-sars-cov-2.     Cover your mouth and nose with a mask, tissue or other face covering to avoid spreading germs.    Wash your hands and face often with soap and water.    Caregivers in these groups are at risk for severe illness due to COVID-19:  o People 65 years and older  o People who live in a nursing home or long-term care facility  o People with chronic disease (lung, heart, cancer, diabetes, kidney, liver, immunologic)  o People who have a weakened immune system, including those who:  - Are in cancer treatment  - Take medicine that weakens the immune system, such as corticosteroids  - Had a bone marrow or organ transplant  - Have an immune deficiency  - Have poorly controlled HIV or AIDS  - Are obese (body mass index of 40 or higher)  - Smoke regularly    Caregivers  should wear gloves while washing dishes, handling laundry and cleaning bedrooms and bathrooms.    Wash and dry laundry with special caution. Don't shake dirty laundry, and use the warmest water setting you can.    If you have a weakened immune system, ask your doctor about other actions you should take.    For more tips, go to www.cdc.gov/coronavirus/2019-ncov/downloads/10Things.pdf.    You should not go back to work until you meet the guidelines above for ending your home isolation. You don't need to be retested for COVID-19 before going back to work--studies show that you won't spread the virus if it's been at least 10 days since your symptoms started (or 20 days, if you have a weak immune system).    Employers: This document serves as formal notice of your employee's medical guidelines for going back to work. They must meet the above guidelines before going back to work in person.    How can I take care of myself?    1. Get lots of rest. Drink extra fluids (unless a doctor has told you not to).    2. Take Tylenol (acetaminophen) for fever or pain. If you have liver or kidney problems, ask your family doctor if it's okay to take Tylenol.     Take either:     650 mg (two 325 mg pills) every 4 to 6 hours, or     1,000 mg (two 500 mg pills) every 8 hours as needed.     Note: Don't take more than 3,000 mg in one day. Acetaminophen is found in many medicines (both prescribed and over-the-counter medicines). Read all labels to be sure you don't take too much.    For children, check the Tylenol bottle for the right dose (based on their age or weight).    3. If you have other health problems (like cancer, heart failure, an organ transplant or severe kidney disease): Call your specialty clinic if you don't feel better in the next 2 days.    4. Know when to call 911: Emergency warning signs include:    Trouble breathing or shortness of breath    Pain or pressure in the chest that doesn't go away    Feeling confused like you  haven't felt before, or not being able to wake up    Bluish-colored lips or face    5. Sign up for [facility]. We know it's scary to hear that you have COVID-19. We want to track your symptoms to make sure you're okay over the next 2 weeks. Please look for an email from [employer] [facility]--this is a free, online program that we'll use to keep in touch. To sign up, follow the link in the email. Learn more at [email or web address].    Where can I get more information?    [facility]: [email or web address]    Coronavirus Basics: www.health.[email or web address].[place]./diseases/coronavirus/basics.html    What to Do If You're Sick: www.cdc.gov/coronavirus/2019-ncov/about/steps-when-sick.html    Ending Home Isolation: www.cdc.gov/coronavirus/2019-ncov/hcp/disposition-in-home-patients.html     Caring for Someone with COVID-19: www.cdc.gov/coronavirus/2019-ncov/if-you-are-sick/care-for-someone.html     [facility] clinical trials (COVID-19 research studies): clinicalaffairs.[facility]-clinical-trials     A Positive COVID-19 letter will be sent via [employer] or the Mail.  (Exception, no letters sent to Presurgerical/Preprocedure Patients)    [Name]  Jose MADRID [facility] Nurse Advisors

## 2021-10-23 ENCOUNTER — HEALTH MAINTENANCE LETTER (OUTPATIENT)
Age: 56
End: 2021-10-23

## 2022-04-05 ENCOUNTER — PATIENT OUTREACH (OUTPATIENT)
Dept: FAMILY MEDICINE | Facility: CLINIC | Age: 57
End: 2022-04-05
Payer: COMMERCIAL

## 2022-04-05 DIAGNOSIS — R87.610 ASCUS OF CERVIX WITH NEGATIVE HIGH RISK HPV: ICD-10-CM

## 2022-04-18 DIAGNOSIS — R10.2 PELVIC PAIN IN FEMALE: Primary | ICD-10-CM

## 2022-04-19 ENCOUNTER — HOSPITAL ENCOUNTER (OUTPATIENT)
Dept: ULTRASOUND IMAGING | Facility: CLINIC | Age: 57
Discharge: HOME | End: 2022-04-19
Attending: OBSTETRICS & GYNECOLOGY | Admitting: OBSTETRICS & GYNECOLOGY
Payer: COMMERCIAL

## 2022-04-19 DIAGNOSIS — R10.2 PELVIC PAIN IN FEMALE: ICD-10-CM

## 2022-04-19 PROCEDURE — 76856 US EXAM PELVIC COMPLETE: CPT

## 2022-04-27 ENCOUNTER — HOSPITAL ENCOUNTER (OUTPATIENT)
Dept: MAMMOGRAPHY | Facility: CLINIC | Age: 57
Discharge: HOME | End: 2022-04-27
Attending: OBSTETRICS & GYNECOLOGY | Admitting: OBSTETRICS & GYNECOLOGY
Payer: COMMERCIAL

## 2022-04-27 DIAGNOSIS — Z12.31 VISIT FOR SCREENING MAMMOGRAM: ICD-10-CM

## 2022-04-27 PROCEDURE — 77067 SCR MAMMO BI INCL CAD: CPT

## 2022-05-11 ENCOUNTER — OFFICE VISIT (OUTPATIENT)
Dept: FAMILY MEDICINE | Facility: CLINIC | Age: 57
End: 2022-05-11
Payer: COMMERCIAL

## 2022-05-11 VITALS
BODY MASS INDEX: 25.54 KG/M2 | RESPIRATION RATE: 16 BRPM | DIASTOLIC BLOOD PRESSURE: 82 MMHG | HEART RATE: 70 BPM | OXYGEN SATURATION: 100 % | TEMPERATURE: 98.1 F | WEIGHT: 162.7 LBS | HEIGHT: 67 IN | SYSTOLIC BLOOD PRESSURE: 128 MMHG

## 2022-05-11 DIAGNOSIS — N81.4 UTERINE PROLAPSE: ICD-10-CM

## 2022-05-11 DIAGNOSIS — Z00.00 WELL ADULT EXAM: Primary | ICD-10-CM

## 2022-05-11 DIAGNOSIS — I10 BENIGN ESSENTIAL HYPERTENSION: ICD-10-CM

## 2022-05-11 LAB
ANION GAP SERPL CALCULATED.3IONS-SCNC: 5 MMOL/L (ref 3–14)
BUN SERPL-MCNC: 9 MG/DL (ref 7–30)
CALCIUM SERPL-MCNC: 9 MG/DL (ref 8.5–10.1)
CHLORIDE BLD-SCNC: 108 MMOL/L (ref 94–109)
CHOLEST SERPL-MCNC: 197 MG/DL
CO2 SERPL-SCNC: 29 MMOL/L (ref 20–32)
CREAT SERPL-MCNC: 0.85 MG/DL (ref 0.52–1.04)
FASTING STATUS PATIENT QL REPORTED: YES
GFR SERPL CREATININE-BSD FRML MDRD: 79 ML/MIN/1.73M2
GLUCOSE BLD-MCNC: 98 MG/DL (ref 70–99)
HDLC SERPL-MCNC: 56 MG/DL
LDLC SERPL CALC-MCNC: 108 MG/DL
NONHDLC SERPL-MCNC: 141 MG/DL
POTASSIUM BLD-SCNC: 4.2 MMOL/L (ref 3.4–5.3)
SODIUM SERPL-SCNC: 142 MMOL/L (ref 133–144)
TRIGL SERPL-MCNC: 166 MG/DL

## 2022-05-11 PROCEDURE — 80048 BASIC METABOLIC PNL TOTAL CA: CPT | Performed by: OBSTETRICS & GYNECOLOGY

## 2022-05-11 PROCEDURE — 99396 PREV VISIT EST AGE 40-64: CPT | Performed by: OBSTETRICS & GYNECOLOGY

## 2022-05-11 PROCEDURE — 87624 HPV HI-RISK TYP POOLED RSLT: CPT | Performed by: OBSTETRICS & GYNECOLOGY

## 2022-05-11 PROCEDURE — 80061 LIPID PANEL: CPT | Performed by: OBSTETRICS & GYNECOLOGY

## 2022-05-11 PROCEDURE — 99213 OFFICE O/P EST LOW 20 MIN: CPT | Mod: 25 | Performed by: OBSTETRICS & GYNECOLOGY

## 2022-05-11 PROCEDURE — 36415 COLL VENOUS BLD VENIPUNCTURE: CPT | Performed by: OBSTETRICS & GYNECOLOGY

## 2022-05-11 PROCEDURE — G0145 SCR C/V CYTO,THINLAYER,RESCR: HCPCS | Performed by: OBSTETRICS & GYNECOLOGY

## 2022-05-11 RX ORDER — LISINOPRIL 5 MG/1
5 TABLET ORAL 2 TIMES DAILY
Qty: 180 TABLET | Refills: 3 | Status: SHIPPED | OUTPATIENT
Start: 2022-05-11 | End: 2023-06-12

## 2022-05-11 ASSESSMENT — PAIN SCALES - GENERAL: PAINLEVEL: MILD PAIN (2)

## 2022-05-11 NOTE — PROGRESS NOTES
Subjective:Claudette is here for yearly physical. Current concerns are: She has pain with intercourse and feels some pressure in the vaginal area.  She went through menopause about 7 years ago.  No vaginal bleeding.  The pain is mostly left-sided.  Notices it after she has been upright as well-walking/standing etc.        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      Current Outpatient Medications   Medication Sig Dispense Refill     lisinopril (ZESTRIL) 5 MG tablet Take 1 tablet (5 mg) by mouth 2 times daily 180 tablet 3     lisinopril (ZESTRIL) 5 MG tablet Take 1 tablet (5 mg) by mouth daily 30 tablet 11     multivitamin w/minerals (THERA-VIT-M) tablet Take 1 tablet by mouth daily       predniSONE (DELTASONE) 20 MG tablet Take 3 tablets (60 mg) by mouth daily 30 tablet 0     valACYclovir (VALTREX) 500 MG tablet Take 1 tablet (500 mg) by mouth 2 times daily 14 tablet 6      Allergies   Allergen Reactions     Penicillins Rash      History   Smoking Status     Never Smoker   Smokeless Tobacco     Never Used      Past Surgical History:   Procedure Laterality Date     COLONOSCOPY N/A 10/27/2015    Procedure: COMBINED COLONOSCOPY, SINGLE OR MULTIPLE BIOPSY/POLYPECTOMY BY BIOPSY;  Surgeon: Rohan Tirado MD;  Location:  GI     HC EXC BENIGN SKIN LESION TRUNK/ARM/LEG >4 CM  03/12/10    excision of left buttock scar     LAPAROSCOPIC CHOLECYSTECTOMY  12/8/2010    LAPAROSCOPIC CHOLECYSTECTOMY performed by EDER HURT at  OR     [facility] PART REMV BLADDER,REIMPLNT URETER  age 6      Social History     Tobacco Use     Smoking status: Never Smoker     Smokeless tobacco: Never Used   Vaping Use     Vaping Use: Never used   Substance Use Topics     Alcohol use: No     Comment: rare occ.     Drug use: No      Family History   Problem Relation Age of Onset     Hypertension Mother      Cerebrovascular Disease Maternal  "Grandfather      Heart Disease Maternal Grandmother         MI ,  at age 86     Osteoporosis Maternal Grandmother      Musculoskeletal Disorder Maternal Grandmother      Arthritis Maternal Grandmother         RA     Cancer Paternal Grandfather         Luekemia     Family History Negative Other        ROS: A 12 point review of systems is negative except for the following:  See above      Physical Exam: /82 (BP Location: Left arm, Patient Position: Sitting, Cuff Size: Adult Regular)   Pulse 70   Temp 98.1  F (36.7  C)   Resp 16   Ht 1.689 m (5' 6.5\")   Wt 73.8 kg (162 lb 11.2 oz)   SpO2 100%   BMI 25.87 kg/m    HEENT:    Sclerae and conjunctiva are normal.   Ear canals and TMs look normal.  Nasal mucosa is pink  - no polyps or masses seen.  Throat is unremarkable . Mucous membranes are moist.   Neck is supple, mobile, no adenopathy or masses palpable. The thyroid feels normal.   Normal range of motion noted.  Chest is clear to auscultation.  No wheezes, rales or rhonchi heard.  Cardiac exam is normal with s1, s2, no murmurs or adventitious sounds.Normal rate and rhythm is heard.   Abdomen is soft,  nondistended, No masses felt.No HSM. No guarding or rigidity or rebound   noted. Palpation reveals  no    tenderness   Normal bowel sounds heard.   Pelvic exam:My nurse Velia was present to chaperone the exam.  The external genitalia appeared normal.    The vaginal vault was without bleeding  or   discharge   or odor.  There is vaginal atrophy noted consistent with menopause.  The cervix was smooth   and shiny and normal in appearance.    A pap was obtained.    She has grade 3 uterine prolapse and a mild cystocele.  Bimanual exam revealed a 6 week   sized uterus. It does  descend  Very  well in the vaginal vault.    No adnexal masses were felt.    Based on this exam she would be a candidate for vaginal hysterectomy.  Bimanual exam did produce some pain on the left side of the vaginal vault alongside " the uterus and I suspect this is related to the atrophy from menopause because I did not feel any masses.  Recent ultrasound was also normal:    FINDINGS:     UTERUS: 5.9 x 4.9 x 2.6 cm. No fibroids.     ENDOMETRIUM: 2 mm. Homogeneous.     RIGHT OVARY: 1.9 x 1.4 x 1.2 cm. Normal. Flow demonstrated.     LEFT OVARY: 2.4 x 1.1 x 0.9 cm. Normal. Flow demonstrated.     No significant free fluid.                                                                      IMPRESSION:  1.  Normal pelvic ultrasound.        JANNETH CHENG MD           The breast exam was declined.      We did discuss the option for an exam   and I suggested that she should be doing a self-breast exam   monthly and after the age of 40 a yearly mammogram   and she feels comfortable that this is sufficient.             Assessment/Plan:    The yearly exam is normal except for 1.  Uterine prolapse- grade 3 - we discussed options including hysterectomy versus conservative management.    2.  Menopausal symptoms-she has vaginal dryness with vaginal atrophy.  We discussed oral estrogen versus vaginal estrogen.  If she takes estrogen she will need to take progesterone and the combination does increase her risk of breast cancer slightly.  Because she is having pelvic pain which I suspect is related to either vaginal dryness or uterine prolapse I did suggest that she consider hysterectomy as an option for managing this pain especially since she is having dyspareunia and this is preventing her from having intercourse.  If she does have a hysterectomy then she has the option of taking oral or vaginal estrogen without worrying about taking a progestational agent.  I gave her some information sheets to read on hormone replacement and hysterectomy and we will discuss this further when she returns from her trip to [place].    3.  Benign essential hypertension-stable on lisinopril.  Will refill as needed.  Check basic panel today.    Additional Plan:Diet and  rest and exercise discussed.      I have previously advised going for a 5 mile walk daily if possible       See labs and orders.    .Immunizations reviewed(TDAP, pneumovax, shingles vaccine,etc)and discussed-including advice for yearly flu shot/tetanus update.     The following vaccines given today:       Hepatitis C and HIV testing offered    these tests were declined.     Calcium supplementation advised     Colonoscopy at age 50  - again at 60    Mammogram  Is advised beginning at age 40-pt to be responsible for getting this done     DEXA is advised beginning at age 65      Labs done: see orders  -basic panel and lipids.    I advised the following exams with specialists:    1. Dental evaluation yearly    2. Dermatology evaluation for mole exam yearly    3. Ophthalmology exam yearly to check for glaucoma, etc          Living will was discussed.         Followup in 12   months, sooner if concerns arise.   NAHEED Doss MD(electronic signature)

## 2022-05-16 LAB
BKR LAB AP GYN ADEQUACY: NORMAL
BKR LAB AP GYN INTERPRETATION: NORMAL
BKR LAB AP HPV REFLEX: NORMAL
BKR LAB AP PREVIOUS ABNL DX: NORMAL
BKR LAB AP PREVIOUS ABNORMAL: NORMAL
PATH REPORT.COMMENTS IMP SPEC: NORMAL
PATH REPORT.COMMENTS IMP SPEC: NORMAL
PATH REPORT.RELEVANT HX SPEC: NORMAL

## 2022-05-18 LAB
HUMAN PAPILLOMA VIRUS 16 DNA: NEGATIVE
HUMAN PAPILLOMA VIRUS 18 DNA: NEGATIVE
HUMAN PAPILLOMA VIRUS FINAL DIAGNOSIS: NORMAL
HUMAN PAPILLOMA VIRUS OTHER HR: NEGATIVE

## 2022-05-19 ENCOUNTER — PATIENT OUTREACH (OUTPATIENT)
Dept: FAMILY MEDICINE | Facility: CLINIC | Age: 57
End: 2022-05-19
Payer: COMMERCIAL

## 2022-10-09 ENCOUNTER — HEALTH MAINTENANCE LETTER (OUTPATIENT)
Age: 57
End: 2022-10-09

## 2023-04-20 ENCOUNTER — PATIENT OUTREACH (OUTPATIENT)
Dept: CARE COORDINATION | Facility: CLINIC | Age: 58
End: 2023-04-20
Payer: COMMERCIAL

## 2023-04-24 ENCOUNTER — PATIENT OUTREACH (OUTPATIENT)
Dept: FAMILY MEDICINE | Facility: CLINIC | Age: 58
End: 2023-04-24
Payer: COMMERCIAL

## 2023-04-24 DIAGNOSIS — R87.610 ASCUS OF CERVIX WITH NEGATIVE HIGH RISK HPV: ICD-10-CM

## 2023-05-04 ENCOUNTER — PATIENT OUTREACH (OUTPATIENT)
Dept: CARE COORDINATION | Facility: CLINIC | Age: 58
End: 2023-05-04
Payer: COMMERCIAL

## 2023-05-31 ENCOUNTER — HOSPITAL ENCOUNTER (OUTPATIENT)
Dept: MAMMOGRAPHY | Facility: CLINIC | Age: 58
Discharge: HOME | End: 2023-05-31
Attending: OBSTETRICS & GYNECOLOGY | Admitting: OBSTETRICS & GYNECOLOGY
Payer: COMMERCIAL

## 2023-05-31 DIAGNOSIS — Z12.31 VISIT FOR SCREENING MAMMOGRAM: ICD-10-CM

## 2023-05-31 PROCEDURE — 77067 SCR MAMMO BI INCL CAD: CPT

## 2023-06-07 ENCOUNTER — HOSPITAL ENCOUNTER (EMERGENCY)
Facility: CLINIC | Age: 58
Discharge: HOME | End: 2023-06-07
Attending: FAMILY MEDICINE | Admitting: FAMILY MEDICINE
Payer: COMMERCIAL

## 2023-06-07 ENCOUNTER — APPOINTMENT (OUTPATIENT)
Dept: CT IMAGING | Facility: CLINIC | Age: 58
End: 2023-06-07
Attending: FAMILY MEDICINE
Payer: COMMERCIAL

## 2023-06-07 VITALS
SYSTOLIC BLOOD PRESSURE: 115 MMHG | DIASTOLIC BLOOD PRESSURE: 80 MMHG | BODY MASS INDEX: 25.9 KG/M2 | HEART RATE: 87 BPM | HEIGHT: 67 IN | OXYGEN SATURATION: 98 % | WEIGHT: 165 LBS | RESPIRATION RATE: 18 BRPM | TEMPERATURE: 98 F

## 2023-06-07 DIAGNOSIS — R20.0 NUMBNESS AND TINGLING OF LEFT SIDE OF FACE: ICD-10-CM

## 2023-06-07 DIAGNOSIS — M54.2 NECK PAIN: ICD-10-CM

## 2023-06-07 DIAGNOSIS — R42 LIGHTHEADEDNESS: ICD-10-CM

## 2023-06-07 DIAGNOSIS — R20.2 NUMBNESS AND TINGLING OF UPPER EXTREMITY: ICD-10-CM

## 2023-06-07 DIAGNOSIS — R20.0 NUMBNESS AND TINGLING OF UPPER EXTREMITY: ICD-10-CM

## 2023-06-07 DIAGNOSIS — R20.2 NUMBNESS AND TINGLING OF LEFT SIDE OF FACE: ICD-10-CM

## 2023-06-07 DIAGNOSIS — R51.9 ACUTE NONINTRACTABLE HEADACHE, UNSPECIFIED HEADACHE TYPE: ICD-10-CM

## 2023-06-07 LAB
ALBUMIN SERPL BCG-MCNC: 4.3 G/DL (ref 3.5–5.2)
ALP SERPL-CCNC: 85 U/L (ref 35–104)
ALT SERPL W P-5'-P-CCNC: 12 U/L (ref 10–35)
ANION GAP SERPL CALCULATED.3IONS-SCNC: 13 MMOL/L (ref 7–15)
AST SERPL W P-5'-P-CCNC: 17 U/L (ref 10–35)
BASOPHILS # BLD AUTO: 0.1 10E3/UL (ref 0–0.2)
BASOPHILS NFR BLD AUTO: 1 %
BILIRUB SERPL-MCNC: 0.5 MG/DL
BUN SERPL-MCNC: 16.5 MG/DL (ref 6–20)
CALCIUM SERPL-MCNC: 8.9 MG/DL (ref 8.6–10)
CHLORIDE SERPL-SCNC: 103 MMOL/L (ref 98–107)
CREAT SERPL-MCNC: 0.8 MG/DL (ref 0.51–0.95)
DEPRECATED HCO3 PLAS-SCNC: 23 MMOL/L (ref 22–29)
EOSINOPHIL # BLD AUTO: 0.2 10E3/UL (ref 0–0.7)
EOSINOPHIL NFR BLD AUTO: 2 %
ERYTHROCYTE [DISTWIDTH] IN BLOOD BY AUTOMATED COUNT: 13.2 % (ref 10–15)
GFR SERPL CREATININE-BSD FRML MDRD: 85 ML/MIN/1.73M2
GLUCOSE SERPL-MCNC: 113 MG/DL (ref 70–99)
HCT VFR BLD AUTO: 36.7 % (ref 35–47)
HGB BLD-MCNC: 12.1 G/DL (ref 11.7–15.7)
IMM GRANULOCYTES # BLD: 0 10E3/UL
IMM GRANULOCYTES NFR BLD: 0 %
LYMPHOCYTES # BLD AUTO: 1.9 10E3/UL (ref 0.8–5.3)
LYMPHOCYTES NFR BLD AUTO: 24 %
MCH RBC QN AUTO: 28.9 PG (ref 26.5–33)
MCHC RBC AUTO-ENTMCNC: 33 G/DL (ref 31.5–36.5)
MCV RBC AUTO: 88 FL (ref 78–100)
MONOCYTES # BLD AUTO: 0.7 10E3/UL (ref 0–1.3)
MONOCYTES NFR BLD AUTO: 9 %
NEUTROPHILS # BLD AUTO: 5.3 10E3/UL (ref 1.6–8.3)
NEUTROPHILS NFR BLD AUTO: 64 %
NRBC # BLD AUTO: 0 10E3/UL
NRBC BLD AUTO-RTO: 0 /100
PLATELET # BLD AUTO: 202 10E3/UL (ref 150–450)
POTASSIUM SERPL-SCNC: 3.5 MMOL/L (ref 3.4–5.3)
PROT SERPL-MCNC: 6.7 G/DL (ref 6.4–8.3)
RBC # BLD AUTO: 4.18 10E6/UL (ref 3.8–5.2)
SODIUM SERPL-SCNC: 139 MMOL/L (ref 136–145)
TROPONIN T SERPL HS-MCNC: <6 NG/L
TSH SERPL DL<=0.005 MIU/L-ACNC: 2.14 UIU/ML (ref 0.3–4.2)
WBC # BLD AUTO: 8.2 10E3/UL (ref 4–11)

## 2023-06-07 PROCEDURE — 80053 COMPREHEN METABOLIC PANEL: CPT | Performed by: FAMILY MEDICINE

## 2023-06-07 PROCEDURE — 85025 COMPLETE CBC W/AUTO DIFF WBC: CPT | Performed by: FAMILY MEDICINE

## 2023-06-07 PROCEDURE — 250N000011 HC RX IP 250 OP 636: Performed by: FAMILY MEDICINE

## 2023-06-07 PROCEDURE — 84443 ASSAY THYROID STIM HORMONE: CPT | Performed by: FAMILY MEDICINE

## 2023-06-07 PROCEDURE — 99285 EMERGENCY DEPT VISIT HI MDM: CPT | Mod: 25 | Performed by: FAMILY MEDICINE

## 2023-06-07 PROCEDURE — 70496 CT ANGIOGRAPHY HEAD: CPT

## 2023-06-07 PROCEDURE — 250N000009 HC RX 250: Performed by: FAMILY MEDICINE

## 2023-06-07 PROCEDURE — 84484 ASSAY OF TROPONIN QUANT: CPT | Performed by: FAMILY MEDICINE

## 2023-06-07 PROCEDURE — 36415 COLL VENOUS BLD VENIPUNCTURE: CPT | Performed by: FAMILY MEDICINE

## 2023-06-07 PROCEDURE — 70450 CT HEAD/BRAIN W/O DYE: CPT | Mod: XS

## 2023-06-07 PROCEDURE — 99284 EMERGENCY DEPT VISIT MOD MDM: CPT | Mod: 25 | Performed by: FAMILY MEDICINE

## 2023-06-07 PROCEDURE — 93005 ELECTROCARDIOGRAM TRACING: CPT | Performed by: FAMILY MEDICINE

## 2023-06-07 PROCEDURE — 93010 ELECTROCARDIOGRAM REPORT: CPT | Performed by: FAMILY MEDICINE

## 2023-06-07 PROCEDURE — 70498 CT ANGIOGRAPHY NECK: CPT

## 2023-06-07 RX ORDER — IOPAMIDOL 755 MG/ML
100 INJECTION, SOLUTION INTRAVASCULAR ONCE
Status: COMPLETED | OUTPATIENT
Start: 2023-06-07 | End: 2023-06-07

## 2023-06-07 RX ADMIN — SODIUM CHLORIDE 100 ML: 9 INJECTION, SOLUTION INTRAVENOUS at 03:23

## 2023-06-07 RX ADMIN — IOPAMIDOL 79 ML: 755 INJECTION, SOLUTION INTRAVENOUS at 03:23

## 2023-06-07 ASSESSMENT — ACTIVITIES OF DAILY LIVING (ADL)
ADLS_ACUITY_SCORE: 35
ADLS_ACUITY_SCORE: 35

## 2023-06-07 NOTE — ED PROVIDER NOTES
History     Chief Complaint   Patient presents with     Dizziness     Neck Pain     HPI  Claudette Burris is a 58 year old female who presents to the ED with lightheadedness and tingling/numbness.  Symptoms started on 5/29/2023 when they [place] cabin.  She had gradual onset of a severe posterior headache.  Pain was in her neck and came up the back of her head.  She felt like she might pass out.  She is not typically a headache sufferer.  It lasted maybe an hour.  She took a nap and then felt better.  She has had recurrent episodes of lightheadedness since then.  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  She thought maybe she was just anxious but that has not really been a big issue for her.  Symptoms lasted about an hour and then resolved.  She has been trying to drink plenty of fluids.  Tonight she woke at about 1:00 and has an achiness in her left neck and trap region posteriorly.  She felt lightheaded and had to put her head down because she thought she might pass out.  This time she became nauseous.  No vomiting.  She was diaphoretic but denied any chest pain or pressure.  No shortness of breath.  Also felt some tingling in the left side of her face and jaw.    She does have hypertension and is on a small dose of lisinopril 5 mg daily for that.    No focal weakness.  No spinning sensation.    Normal stress echo in 2013, coronary artery calcium score of 0 in 2021        Allergies:  Allergies   Allergen Reactions     Penicillins Rash       Problem List:    Patient Active Problem List    Diagnosis Date Noted     ASCUS of cervix with negative high risk HPV 04/19/2021     Priority: Medium     5/22/15 ASCUS pap, neg HR HPV. Plan: cotest in 3 years.   4/19/21 ASCUS pap, neg HR HPV. Plan: Cotest in 1 yr  5/11/22 NIL Pap, Neg HR HPV. Plan: Cotest in 1 yr.   4/24/23 Reminder mychart  7/31/23 Appt        Benign essential hypertension 03/15/2016     Priority: Medium     Vitamin D deficiency 10/29/2012     Priority: Medium     CARDIOVASCULAR SCREENING; LDL GOAL LESS THAN 160 10/31/2010     Priority: Medium     LLQ pain 2009     Priority: Medium     Ovarian cyst 2009     Priority: Medium     Generalized anxiety disorder 2009     Priority: Medium     Diagnosis updated by automated process. Provider to review and confirm.          Past Medical History:    Past Medical History:   Diagnosis Date     ASCUS favoring benign 5/22/15     Other and unspecified uterine inertia, with delivery 97       Past Surgical History:    Past Surgical History:   Procedure Laterality Date     COLONOSCOPY N/A 10/27/2015    Procedure: COMBINED COLONOSCOPY, SINGLE OR MULTIPLE BIOPSY/POLYPECTOMY BY BIOPSY;  Surgeon: Rohan Tirado MD;  Location:  GI     HC EXC BENIGN SKIN LESION TRUNK/ARM/LEG >4 CM  03/12/10    excision of left buttock scar     LAPAROSCOPIC CHOLECYSTECTOMY  2010    LAPAROSCOPIC CHOLECYSTECTOMY performed by EDER HURT at  OR     [facility] PART REMV BLADDER,REIMPLNT URETER  age 6       Family History:    Family History   Problem Relation Age of Onset     Hypertension Mother      Cerebrovascular Disease Maternal Grandfather      Heart Disease Maternal Grandmother         MI ,  at age 86     Osteoporosis Maternal Grandmother      Musculoskeletal Disorder Maternal Grandmother      Arthritis Maternal Grandmother         RA     Cancer Paternal Grandfather         Luekemia     Family History Negative Other        Social History:  Marital Status:   [2]  Social History     Tobacco Use     Smoking status: Never     Smokeless tobacco: Never   Vaping Use     Vaping status: Never Used   Substance Use Topics     Alcohol use: No     Comment: rare occ.     Drug use: No        Medications:    lisinopril (ZESTRIL) 5 MG tablet  multivitamin w/minerals (THERA-VIT-M) tablet  valACYclovir  "(VALTREX) 500 MG tablet  lisinopril (ZESTRIL) 5 MG tablet          Review of Systems   All other systems reviewed and are negative.      Physical Exam   BP: 111/80  Pulse: 71  Temp: 98  F (36.7  C)  Resp: 17  Height: 168.9 cm (5' 6.5\")  Weight: 74.8 kg (165 lb)  SpO2: 96 %  Lying Orthostatic BP: 120/80  Lying Orthostatic Pulse: 68 bpm  Sitting Orthostatic BP: 116/68  Sitting Orthostatic Pulse: 68 bpm      Physical Exam  Constitutional:       General: She is not in acute distress.     Appearance: Normal appearance.   HENT:      Right Ear: Tympanic membrane normal.      Left Ear: Tympanic membrane normal.      Mouth/Throat:      Mouth: Mucous membranes are moist.   Eyes:      Extraocular Movements: Extraocular movements intact.      Pupils: Pupils are equal, round, and reactive to light.   Cardiovascular:      Rate and Rhythm: Normal rate and regular rhythm.   Pulmonary:      Effort: Pulmonary effort is normal.      Breath sounds: Normal breath sounds.   Abdominal:      Palpations: Abdomen is soft.      Tenderness: There is no abdominal tenderness.   Musculoskeletal:      Right lower leg: No edema.      Left lower leg: No edema.   Skin:     General: Skin is warm and dry.      Coloration: Skin is not pale.   Neurological:      General: No focal deficit present.      Mental Status: She is alert and oriented to person, place, and time.      GCS: GCS eye subscore is 4. GCS verbal subscore is 5. GCS motor subscore is 6.      Cranial Nerves: Cranial nerves 2-12 are intact. No cranial nerve deficit or facial asymmetry.      Sensory: Sensation is intact.      Motor: Motor function is intact. No weakness.      Coordination: Coordination is intact. Finger-Nose-Finger Test normal. Rapid alternating movements normal.      Gait: Gait is intact.         ED Course                 Procedures              EKG Interpretation:      Interpreted by Franklyn Dale MD  Time reviewed: 0305  Symptoms at time of EKG: left jaw/face " tingling  Rhythm: normal sinus   Rate: 65  Axis: Normal  Ectopy: none  Conduction: normal  ST Segments/ T Waves: T wave inversion III, V1, V2 and V3  Q Waves: none  Comparison to prior: T wave inversion was present in all of the above leads on 3/26/2013 was only present in V1 on 1/6/2020    Clinical Impression: no acute changes                Critical Care time:  none               Results for orders placed or performed during the hospital encounter of 06/07/23 (from the past 24 hour(s))   CBC with platelets differential    Narrative    The following orders were created for panel order CBC with platelets differential.  Procedure                               Abnormality         Status                     ---------                               -----------         ------                     CBC with platelets and d...[[id number]]                      Final result                 Please view results for these tests on the individual orders.   Comprehensive metabolic panel   Result Value Ref Range    Sodium 139 136 - 145 mmol/L    Potassium 3.5 3.4 - 5.3 mmol/L    Chloride 103 98 - 107 mmol/L    Carbon Dioxide (CO2) 23 22 - 29 mmol/L    Anion Gap 13 7 - 15 mmol/L    Urea Nitrogen 16.5 6.0 - 20.0 mg/dL    Creatinine 0.80 0.51 - 0.95 mg/dL    Calcium 8.9 8.6 - 10.0 mg/dL    Glucose 113 (H) 70 - 99 mg/dL    Alkaline Phosphatase 85 35 - 104 U/L    AST 17 10 - 35 U/L    ALT 12 10 - 35 U/L    Protein Total 6.7 6.4 - 8.3 g/dL    Albumin 4.3 3.5 - 5.2 g/dL    Bilirubin Total 0.5 <=1.2 mg/dL    GFR Estimate 85 >60 mL/min/1.73m2   Troponin T, High Sensitivity   Result Value Ref Range    Troponin T, High Sensitivity <6 <=14 ng/L   TSH with free T4 reflex   Result Value Ref Range    TSH 2.14 0.30 - 4.20 uIU/mL   CBC with platelets and differential   Result Value Ref Range    WBC Count 8.2 4.0 - 11.0 10e3/uL    RBC Count 4.18 3.80 - 5.20 10e6/uL    Hemoglobin 12.1 11.7 - 15.7 g/dL    Hematocrit 36.7 35.0 - 47.0 %    MCV 88 78 - 100  fL    MCH 28.9 26.5 - 33.0 pg    MCHC 33.0 31.5 - 36.5 g/dL    RDW 13.2 10.0 - 15.0 %    Platelet Count 202 150 - 450 10e3/uL    % Neutrophils 64 %    % Lymphocytes 24 %    % Monocytes 9 %    % Eosinophils 2 %    % Basophils 1 %    % Immature Granulocytes 0 %    NRBCs per 100 WBC 0 <1 /100    Absolute Neutrophils 5.3 1.6 - 8.3 10e3/uL    Absolute Lymphocytes 1.9 0.8 - 5.3 10e3/uL    Absolute Monocytes 0.7 0.0 - 1.3 10e3/uL    Absolute Eosinophils 0.2 0.0 - 0.7 10e3/uL    Absolute Basophils 0.1 0.0 - 0.2 10e3/uL    Absolute Immature Granulocytes 0.0 <=0.4 10e3/uL    Absolute NRBCs 0.0 10e3/uL   CT Head w/o Contrast    Narrative    EXAM: CT HEAD W/O CONTRAST  LOCATION: [facility]  DATE/TIME: 6/7/2023 3:41 AM CDT    INDICATION: Severe headache 9 days ago, neck pain, bilateral arm tingling, left face tingling.  COMPARISON: 10/24/2012.  TECHNIQUE: Routine CT Head without IV contrast. Multiplanar reformats. Dose reduction techniques were used.    FINDINGS:  INTRACRANIAL CONTENTS: No intracranial hemorrhage, extraaxial collection, or mass effect.  No CT evidence of acute infarct. Normal parenchymal attenuation. Normal ventricles and sulci.     VISUALIZED ORBITS/SINUSES/MASTOIDS: No intraorbital abnormality. No paranasal sinus mucosal disease. No middle ear or mastoid effusion.    BONES/SOFT TISSUES: No acute abnormality.      Impression    IMPRESSION:  1.  No acute intracranial process.   CTA Head Neck with Contrast    Narrative    EXAM: CTA HEAD NECK W CONTRAST  LOCATION: [facility]  DATE/TIME: 6/7/2023 3:41 AM CDT    INDICATION: Severe headache 9 days ago, neck pain, bilateral arm tingling, left face tingling  COMPARISON: None.  CONTRAST: 79mL Isovue 370  TECHNIQUE: Head and neck CT angiogram with IV contrast. Noncontrast head CT followed by axial helical CT images of the head and neck vessels obtained during the arterial phase of intravenous contrast  administration. Axial 2D reconstructed images and   multiplanar 3D MIP reconstructed images of the head and neck vessels were performed by the technologist. Dose reduction techniques were used. All stenosis measurements made according to NASCET criteria unless otherwise specified.    FINDINGS:     HEAD CTA:  ANTERIOR CIRCULATION: No stenosis/occlusion, aneurysm, or high flow vascular malformation. Standard [place] anatomy.    POSTERIOR CIRCULATION: No stenosis/occlusion, aneurysm, or high flow vascular malformation. Dominant right and smaller left vertebral artery contribute to a normal basilar artery.     DURAL VENOUS SINUSES: Expected enhancement of the major dural venous sinuses.    NECK CTA:  RIGHT CAROTID: No measurable stenosis or dissection.    LEFT CAROTID: No measurable stenosis or dissection.    VERTEBRAL ARTERIES: No focal stenosis or dissection. Dominant right and smaller left vertebral arteries.    AORTIC ARCH: Two-vessel aortic arch anatomy with no significant stenosis at the origin of the great vessels.    NONVASCULAR STRUCTURES: Unremarkable.      Impression    IMPRESSION:     HEAD CTA:   1.  Normal CTA [place].    NECK CTA:  1.  No significant stenosis or dissection.       Medications   new 100 ml saline bag (100 mLs Intravenous $Given 6/7/23 0323)   iopamidol (ISOVUE-370) solution 100 mL (79 mLs Intravenous $Given 6/7/23 0323)       Assessments & Plan (with Medical Decision Making)  58-year-old female had gradual onset of a severe posterior headache with pain down to the neck on 5/29/2023.  She is not typically a headache sufferer.  She also had numbness in both arms and this morning woke with pain in the left posterior lateral neck/trap and also numbness in the left side of her face and jaw.  No associated chest pain or shortness of breath.  She was diaphoretic and nauseous but that cleared up.  No weakness or speech difficulty.  No balance problems.  She would get lightheaded  but no vertigo.    EKG showed no acute ischemic changes.  No change from previous.  Troponin was undetectable.  Rest of the labs are unremarkable.  Head CT was negative as was a CTA of her head and neck.  I wanted to keep her in the ED and get an MRI in the morning but she really wants to go home and do that as an outpatient.  Order was placed.  She will follow-up with her primary physician if persistent problems.  If the MRI shows anything significant, radiology is supposed to call me and I will contact her and arrange for further plans.  Verbal and written discharge instructions given.  She is comfortable with this plan.  She will continue with her current medications.  Blood pressure is well controlled on the lisinopril.     I have reviewed the nursing notes.    I have reviewed the findings, diagnosis, plan and need for follow up with the patient.           Medical Decision Making  The patient's presentation was of moderate complexity (an undiagnosed new problem with uncertain diagnosis).    The patient's evaluation involved:  ordering and/or review of 3+ test(s) in this encounter (see separate area of note for details)    The patient's management necessitated moderate risk (prescription drug management including medications given in the ED).        New Prescriptions    No medications on file       Final diagnoses:   Numbness and tingling of left side of face   Acute nonintractable headache, unspecified headache type   Neck pain   Numbness and tingling of upper extremity - bilateral   Lightheadedness       6/7/2023   [facility] EMERGENCY DEPT     Franklyn Dale MD  06/07/23 0604

## 2023-06-07 NOTE — ED TRIAGE NOTES
"Feeling dizzy and lightheaded for past week along with mild left sided neck, shoulder and armpit pain.  Feels her left side of face is a little numb.  No change in vision or mobility.  Woke this am and felt like she was \"going to pass out\".  Home BP was 90s/60s which is \"low\" for her.     Triage Assessment     Row Name 06/07/23 0224       Triage Assessment (Adult)    Airway WDL WDL       Respiratory WDL    Respiratory WDL WDL       Skin Circulation/Temperature WDL    Skin Circulation/Temperature WDL WDL       Cardiac WDL    Cardiac WDL WDL       Peripheral/Neurovascular WDL    Peripheral Neurovascular WDL WDL       Cognitive/Neuro/Behavioral WDL    Cognitive/Neuro/Behavioral WDL WDL              "

## 2023-06-07 NOTE — DISCHARGE INSTRUCTIONS
Expect a call to schedule an MRI of the brain.  If there are any significant abnormalities, the radiologist is supposed to call me.  I will try to contact you to discuss where we go from there.  If the results are normal, you will be able to get them on MyChart.  Recheck in clinic with Dr. Doss or one of his partners if persistent problems.  Return to the ED if you worsen have any concerns.  It was great to visit with both of you this morning.  I hope things settle down quickly for you.    Thank you for choosing [facility]. We appreciate the opportunity to meet your urgent medical needs. Please let us know if we could have done anything to make your stay more satisfying.    After discharge, please closely monitor for any new or worsening symptoms. Return to the Emergency Department if you develop any acute worsening signs or symptoms.    If you had lab work, cultures or imaging studies done during your stay, the final results may still be pending. We will call you if your plan of care needs to change. However, if you are not improving as expected, please follow up with your primary care provider or clinic.     Start any prescription medications that were prescribed to you and take them as directed.     Please see additional handouts that may be pertinent to your condition.

## 2023-06-10 DIAGNOSIS — I10 BENIGN ESSENTIAL HYPERTENSION: ICD-10-CM

## 2023-06-12 RX ORDER — LISINOPRIL 5 MG/1
5 TABLET ORAL 2 TIMES DAILY
Qty: 180 TABLET | Refills: 0 | Status: SHIPPED | OUTPATIENT
Start: 2023-06-12 | End: 2024-09-12 | Stop reason: ALTCHOICE

## 2023-06-12 NOTE — TELEPHONE ENCOUNTER
Prescription approved per [facility] Refill Protocol.    Yanira refill given    Francis Gonzalez,FRANCISCON, RN

## 2023-06-19 ENCOUNTER — OFFICE VISIT (OUTPATIENT)
Dept: OBGYN | Facility: CLINIC | Age: 58
End: 2023-06-19
Payer: COMMERCIAL

## 2023-06-19 VITALS
SYSTOLIC BLOOD PRESSURE: 143 MMHG | WEIGHT: 167.7 LBS | HEART RATE: 78 BPM | DIASTOLIC BLOOD PRESSURE: 84 MMHG | BODY MASS INDEX: 26.66 KG/M2

## 2023-06-19 DIAGNOSIS — I10 BENIGN ESSENTIAL HYPERTENSION: ICD-10-CM

## 2023-06-19 DIAGNOSIS — Z01.419 WELL WOMAN EXAM WITH ROUTINE GYNECOLOGICAL EXAM: Primary | ICD-10-CM

## 2023-06-19 DIAGNOSIS — Z12.4 CERVICAL CANCER SCREENING: ICD-10-CM

## 2023-06-19 DIAGNOSIS — N81.2 SECOND DEGREE UTERINE PROLAPSE: ICD-10-CM

## 2023-06-19 DIAGNOSIS — N95.2 VAGINAL ATROPHY: ICD-10-CM

## 2023-06-19 PROCEDURE — G0145 SCR C/V CYTO,THINLAYER,RESCR: HCPCS | Performed by: OBSTETRICS & GYNECOLOGY

## 2023-06-19 PROCEDURE — 87624 HPV HI-RISK TYP POOLED RSLT: CPT | Performed by: OBSTETRICS & GYNECOLOGY

## 2023-06-19 PROCEDURE — 99386 PREV VISIT NEW AGE 40-64: CPT | Performed by: OBSTETRICS & GYNECOLOGY

## 2023-06-19 PROCEDURE — 99212 OFFICE O/P EST SF 10 MIN: CPT | Mod: 25 | Performed by: OBSTETRICS & GYNECOLOGY

## 2023-06-19 NOTE — PROGRESS NOTES
SUBJECTIVE:       HPI: Claudette Burris is a 58 year old  who presents today for WWE.     Sore on lateral left breast, recent mammogram negative.  No masses or other concerns.  Wearing bras less now that at home.      Ob Hx:  s/p   OB History    Para Term  AB Living   3 3 3 0 0 0   SAB IAB Ectopic Multiple Live Births   0 0 0 0 0      # Outcome Date GA Lbr Jae/2nd Weight Sex Delivery Anes PTL Lv   3 Term 10/19/98 40w0d 04:00 3.742 kg (8 lb 4 oz) F          Name: Deana   2 Term 97 40w0d 15:00 3.487 kg (7 lb 11 oz) F          Name: Elinor   1 Term 05/15/95 40w0d 09:00 3.544 kg (7 lb 13 oz) M          Name: Enrrique        Gyn Hx: Patient's last menstrual period was 10/06/2015 (approximate).  Menopause- 50yo. No hx HRT  Patient is sexually active. One male partner   Last pap was   Lab Results   Component Value Date    PAP ASC-US 2021    PAP ASC-US 2015    PAP NIL 2011     Last Mammogram 23 birads 1  Colon Screening 10/2015         reports that she has never smoked. She has never used smokeless tobacco.      Today's PHQ-2 Score:       2023     1:52 PM   PHQ-2 (  [employer])   Q1: Little interest or pleasure in doing things 0   Q2: Feeling down, depressed or hopeless 0   PHQ-2 Score 0     Today's PHQ-9 Score:       2019     1:44 PM   PHQ-9 SCORE   PHQ-9 Total Score 0     Today's DAYAN-7 Score:       2019     1:44 PM   DAYAN-7 SCORE   Total Score 0       Problem list and histories reviewed & adjusted, as indicated.  Additional history: as documented.    Patient Active Problem List   Diagnosis     Generalized anxiety disorder     LLQ pain     Ovarian cyst     CARDIOVASCULAR SCREENING; LDL GOAL LESS THAN 160     Vitamin D deficiency     Benign essential hypertension     ASCUS of cervix with negative high risk HPV     Past Surgical History:   Procedure Laterality Date     COLONOSCOPY N/A 10/27/2015    Procedure: COMBINED COLONOSCOPY, SINGLE OR  MULTIPLE BIOPSY/POLYPECTOMY BY BIOPSY;  Surgeon: Rohan Tirado MD;  Location:  GI     HC EXC BENIGN SKIN LESION TRUNK/ARM/LEG >4 CM  03/12/10    excision of left buttock scar     LAPAROSCOPIC CHOLECYSTECTOMY  2010    LAPAROSCOPIC CHOLECYSTECTOMY performed by EDER HURT at  OR     [facility] PART REMV BLADDER,REIMPLNT URETER  age 6      Social History     Tobacco Use     Smoking status: Never     Smokeless tobacco: Never   Vaping Use     Vaping status: Never Used   Substance Use Topics     Alcohol use: No     Comment: rare occ.      Problem (# of Occurrences) Relation (Name,Age of Onset)    Arthritis (1) Maternal Grandmother: RA    Cancer (1) Paternal Grandfather: Luekemia    Heart Disease (1) Maternal Grandmother: MI ,  at age 86    Hypertension (1) Mother (Maryam)    Musculoskeletal Disorder (1) Maternal Grandmother    Osteoporosis (1) Maternal Grandmother    Cerebrovascular Disease (1) Maternal Grandfather    Family History Negative (1) Other            lisinopril (ZESTRIL) 5 MG tablet, TAKE 1 TABLET (5 MG) BY MOUTH 2 TIMES DAILY  multivitamin w/minerals (THERA-VIT-M) tablet, Take 1 tablet by mouth daily  valACYclovir (VALTREX) 500 MG tablet, Take 1 tablet (500 mg) by mouth 2 times daily  lisinopril (ZESTRIL) 5 MG tablet, Take 1 tablet (5 mg) by mouth daily (Patient not taking: Reported on 2023)    No current facility-administered medications on file prior to visit.    Allergies   Allergen Reactions     Penicillins Rash       ROS:  10 Point review of systems negative other noted above in HPI    OBJECTIVE:     BP (!) 143/84   Pulse 78   Wt 76.1 kg (167 lb 11.2 oz)   LMP 10/06/2015 (Approximate)   BMI 26.66 kg/m    Body mass index is 26.66 kg/m .      Gen: Alert, oriented, appropriately interactive, NAD  Eyes: Eyes grossly normal to inspection, conjunctivae and sclerae normal  CV: No edema  Resp: no audible wheeze, cough, or visible cyanosis.  No visible retractions or increased  work of breathing.  Able to speak fully in complete sentences.  Breasts: no axillary adenopathy, no dominant masses, no skin changes, no nipple discharge, nontender  Abdomen: soft, non tender, non distended, no masses, no hernias.  External genitalia: no lesions; normal appearing external genitalia, bartholins glands, urethra, skenes glands  Vagina: no masses or lesions or discharge, atrophic  Cervix: no masses or lesions or discharge   Bimanual exam:   Nontender pelvic floor muscles  Urethra: nontender   Bladder: nontender and without massess, well supported   Uterus: midline, anteverted, small, mobile  no masses, non-tender  Adnexa: no masses or tenderness appreciated   No cervical motion tenderness  MSK: normal gait, symmetric movements UE & LE  Lower extremities: non-tender, no edema  Neuro: Cranial nerves grossly intact, mentation intact and speech normal  Psych: mentation appears normal, affect normal/bright, judgement and insight intact, normal speech and appearance well-groomed      In-Clinic Test Results:  No results found for this or any previous visit (from the past 24 hour(s)).    ASSESSMENT/PLAN:                                                      Claudette Burris is a 58 year old  who presents today for WWE      ICD-10-CM    1. Well woman exam with routine gynecological exam  Z01.419       2. Cervical cancer screening  Z12.4 Pap Screen with HPV - recommended age 30 - 65 years      3. Second degree uterine prolapse  N81.2       4. Vaginal atrophy  N95.2       5. Benign essential hypertension  I10           Pap with cotesting  Screening mammogram- UTD  Screening colonoscopy- UTD    Grade 2 cervical descent with valsalva, asymptomatic. Discussed when to seek further management, options briefly reviewed. All questions answered.    Mastalgia, recent imaging reassuring. Exam benign today, suspect msk-etiology. Conservative measures reviewed and printed, RTO in 3 months for persistent symptoms and  "follow-up imaging.     Vaginal atrophy- Discussed treatment options for dyspareunia/atrophy. Recommend vaginal moisturizer use 2-3 times per week before bed (not before intercourse). Vaginal moisturizers available include but are not limited to Replens, [place]-moisturizer. If amenable or fail non-hormonal moisturizers, may also consider vaginal estrogen.  Furthermore, recommend judicious use of lubricants during intercourse. If attempting conception, may use Pre-Seed. If not attempting conception, then recommend water or silicone-based lubricants, such as KY, Atroglide, Sylk, Uberlube, Good Clean Love (510K). Be careful using silicone-based lubricants with intimaxy or sex aids.   Avoid using products advertising \"heat,\" flavors/scents, or other additional features as these can be irritating.    HTN- Patient to establish with new Primary care provider in near future for refills of chronic medications.       Briana Sargent, DO  [facility] [place]  "

## 2023-06-19 NOTE — PATIENT INSTRUCTIONS
If you have any questions regarding your visit, Please contact your care team.    [employer] [facility]: [phone]      Women [facility] CLINIC HOURS TELEPHONE NUMBER   Briana Sargent DO.    SUPA [facility]-Surgery Scheduler  [place] - Surgery Scheduler    TERI Huang RN Kylie, RN     Monday, Thursday  [place]  7am-3pm    Tuesday, Wednesday  [place]  7am-3pm    [place] Friday &   [place]    Typical Surgery Days: Thursday or Friday   [facility]  [address] [place] [address] [phone] Phone  [phone] Fax    [facility]  [address] [place] [address] [phone] Phone    Imaging [phone] Phone    [facility]:  [phone] Phone     **Surgeries** Our Surgery Schedulers will contact you to schedule. If you do not receive a call within 3 business days, please call [phone].    Urgent Care locations:  [facility] Saturday and    9 am - 5 pm    Monday-Friday   12 pm - 8 pm  Saturday and    9 am - 5 pm   [phone]       If you need a medication refill, please contact your pharmacy. Please allow 3 business days for your refill to be completed.  As always, Thank you for trusting us with your healthcare needs!      Breast Pain  Breast pain is one of the most common breast disorders experienced by women of all ages, affecting up to 70% of women in their lifetime. While it can be frightening, less than 3% of these women will have breast cancer.  There are two types of breast pain, cyclical and non cyclical. Cyclical breast pain improved and worsens as a results of the changes in hormone levels. Symptoms often worsen the week before menstruation and improve after the start of a period. Non cyclical breast pain is more common in perimenopausal and  menopausal women. Pain can be constant or come and go, may be present in one or both breast and localized to particular part of the breast.   Chest wall pain is also similar to breast pain and can be easily confused with breast pain. Pain coming from the chest wall beneath the breast tissue is often on one side and can worsen with activity or when pressure is applied. Conditions such as osteoarthritis or chostochondritis can cause this type of pain.    Recommendations for management    Lifestyle  -Lower dietary fat intake to less than 20% of total caloric intake  -If current a moderate-heavy caffeine consumer, eliminate caffeine completely (Chocolate, tea, soda, coffee)    Bra  -Attend a professional bra fitting to ensure correct sizing  -Wear at least one well-fitting, supportive bra at all times, even during sleep    Supplements and medications  -NSAIDs: Oral medications may include naproxen, ibuprofen or tylenol. Creams may include diclofenac. Use according to package  -Flaxseed: 25g of flaxseed (2 tablespoons whole flaxseed) daily may reduce cyclical breast pain  -Evening Primrose Oil: may be used in the short term (6 months) for cyclical breast pain. Start with 500mg/day and increase up to 3000mg/day  -Vitamin E: May be used short term (6 months) for cyclical breast pain. May start 400 international unit(s)/day and increase up to 19731 international unit(s)/day  -Chasteberry: 400-500mg once per day has been shown to resude cyclical breast pain    When to return to clinic  If your breast pain does not improve within the next three months, or you experience a new breast symptom such as a lump or nipple discharge, please call your doctor.     Recommend vaginal moisturizer use 2-3 times per week before bed (not before intercourse). Vaginal moisturizers available include but are not limited to Replens, [place]-moisturizer. If amenable or fail non-hormonal moisturizers, may also consider vaginal estrogen.  Furthermore,  "recommend judicious use of lubricants during intercourse. If attempting conception, may use Pre-Seed. If not attempting conception, then recommend water or silicone-based lubricants, such as KY, Atroglide, Sylk, Uberlube, Good Clean Love (510K). Be careful using silicone-based lubricants with intimaxy or sex aids.   Avoid using products advertising \"heat,\" flavors/scents, or other additional features as these can be irritating.      Healthy vulvar-vaginal hygiene practices    Avoid  Substitute    Pantyhose  Stockings with a garter belt    Thigh-high or knee-high stockings   Synthetic underwear Cotton underwear or no underwear   Jeans and other tight pants Loose pants, skirts, dresses   Swimsuits, leotards, thongs, lycra garments Loose-fitting cotton garments   Panty liners Tampons or cotton pads   Scented soaps or shampoos Fragrance-free pH neutral soap (eg, Neutrogena fragrance free, pure olive oil soap, Cetaphil gentle skin cleanser or equivalent ingredients)   Bubble bath Tub baths in the morning and at night without additives and at a comfortable temperature   Scented detergents Unscented detergents   Washcloths Use fingertips for washing; pat dry, don't rub dry   Baby wipes or flushable wipes Rinse with water using sports water bottle or perineal irrigation bottle    Feminine sprays, douches, powders These are not necessary products and can be omitted from personal practices   Dyed toilet articles Toilet articles without dyes   Hair dryers to dry vulva skin without contact Dry vulva by gentle patting         "

## 2024-01-24 ENCOUNTER — OFFICE VISIT (OUTPATIENT)
Dept: FAMILY MEDICINE | Facility: OTHER | Age: 59
End: 2024-01-24
Payer: COMMERCIAL

## 2024-01-24 VITALS
SYSTOLIC BLOOD PRESSURE: 104 MMHG | HEIGHT: 67 IN | OXYGEN SATURATION: 99 % | TEMPERATURE: 97.3 F | BODY MASS INDEX: 26.53 KG/M2 | RESPIRATION RATE: 18 BRPM | WEIGHT: 169 LBS | DIASTOLIC BLOOD PRESSURE: 70 MMHG | HEART RATE: 64 BPM

## 2024-01-24 DIAGNOSIS — K57.30 DIVERTICULAR DISEASE OF COLON: ICD-10-CM

## 2024-01-24 DIAGNOSIS — R73.01 IMPAIRED FASTING GLUCOSE: ICD-10-CM

## 2024-01-24 DIAGNOSIS — E78.5 HYPERLIPIDEMIA LDL GOAL <100: ICD-10-CM

## 2024-01-24 DIAGNOSIS — M53.3 SI (SACROILIAC) JOINT DYSFUNCTION: Primary | ICD-10-CM

## 2024-01-24 PROCEDURE — 90715 TDAP VACCINE 7 YRS/> IM: CPT | Performed by: FAMILY MEDICINE

## 2024-01-24 PROCEDURE — 99214 OFFICE O/P EST MOD 30 MIN: CPT | Mod: 25 | Performed by: FAMILY MEDICINE

## 2024-01-24 PROCEDURE — 90471 IMMUNIZATION ADMIN: CPT | Performed by: FAMILY MEDICINE

## 2024-01-24 ASSESSMENT — PAIN SCALES - GENERAL: PAINLEVEL: MILD PAIN (3)

## 2024-01-24 NOTE — PROGRESS NOTES
"  Assessment & Plan         ICD-10-CM    1. SI (sacroiliac) joint dysfunction  M53.3       2. Diverticular disease of colon  K57.30       3. Hyperlipidemia LDL goal <100  E78.5 Lipid panel reflex to direct LDL Fasting      4. Impaired fasting glucose  R73.01 Basic metabolic panel  (Ca, Cl, CO2, Creat, Gluc, K, Na, BUN)          The history of the pain did sound like diverticular disease but her exam is normal today.  We did talk about how the exam is more suggestive of his SI joint discomfort and this may be secondary to some inflammation in the pelvic floor but the regional inflammation is now gone.  We did review dietary interventions for diverticular disease as well as exercises for SI joint dysfunction to see if we could help maintain through both  She also is in need of a primary provider and needs labs completed.  We did ask her to return fasting for these and then can update a preventative evaluation when she is ready.    I spent a total of 20 minutes on the day of the visit.   Time spent by me doing chart review, history and exam, documentation and further activities per the note    Karla Frausto MD       BMI  Estimated body mass index is 26.87 kg/m  as calculated from the following:    Height as of this encounter: 1.689 m (5' 6.5\").    Weight as of this encounter: 76.7 kg (169 lb).   Weight management plan: Discussed healthy diet and exercise guidelines        Jessy Wilkins is a 58 year old, presenting for the following health issues:  Abdominal Pain        1/24/2024     3:41 PM   Additional Questions   Roomed by luisa   Accompanied by alone         1/24/2024     3:41 PM   Patient Reported Additional Medications   Patient reports taking the following new medications none     History of Present Illness       Reason for visit:  Abdominal and back pain  Symptom onset:  3-4 weeks ago  Symptoms include:  Ache in low back and front left abdomen, loose stool and gas, strong breath  Symptom intensity:  " "Moderate  Symptom progression:  Worsening  Had these symptoms before:  No    She eats 2-3 servings of fruits and vegetables daily.She consumes 0 sweetened beverage(s) daily.She exercises with enough effort to increase her heart rate 20 to 29 minutes per day.  She exercises with enough effort to increase her heart rate 4 days per week.   She is taking medications regularly.                     Objective    /70   Pulse 64   Temp 97.3  F (36.3  C) (Temporal)   Resp 18   Ht 1.689 m (5' 6.5\")   Wt 76.7 kg (169 lb)   LMP 10/06/2015 (Approximate)   SpO2 99%   BMI 26.87 kg/m    Body mass index is 26.87 kg/m .  Physical Exam   GENERAL: alert and no distress  EYES: Eyes grossly normal to inspection, PERRL and conjunctivae and sclerae normal  HENT: ear canals and TM's normal, nose and mouth without ulcers or lesions  NECK: no adenopathy, no asymmetry, masses, or scars  RESP: lungs clear to auscultation - no rales, rhonchi or wheezes  CV: regular rate and rhythm, normal S1 S2, no S3 or S4, no murmur, click or rub, no peripheral edema  ABDOMEN: soft, nontender, no hepatosplenomegaly, no masses and bowel sounds normal  MS: Figure-of-four cross had pressure across the SI joint as well as local tenderness  SKIN: no suspicious lesions or rashes  NEURO: Normal strength and tone, mentation intact and speech normal  PSYCH: mentation appears normal, affect normal/bright  LYMPH: no cervical, supraclavicular, axillary, or inguinal adenopathy            Signed Electronically by: Karla Frausto MD, MD    "

## 2024-02-21 ENCOUNTER — APPOINTMENT (OUTPATIENT)
Dept: CT IMAGING | Facility: CLINIC | Age: 59
End: 2024-02-21
Attending: STUDENT IN AN ORGANIZED HEALTH CARE EDUCATION/TRAINING PROGRAM
Payer: COMMERCIAL

## 2024-02-21 ENCOUNTER — HOSPITAL ENCOUNTER (EMERGENCY)
Facility: CLINIC | Age: 59
Discharge: HOME | End: 2024-02-21
Attending: STUDENT IN AN ORGANIZED HEALTH CARE EDUCATION/TRAINING PROGRAM | Admitting: STUDENT IN AN ORGANIZED HEALTH CARE EDUCATION/TRAINING PROGRAM
Payer: COMMERCIAL

## 2024-02-21 VITALS
WEIGHT: 162 LBS | BODY MASS INDEX: 26.03 KG/M2 | RESPIRATION RATE: 20 BRPM | HEART RATE: 63 BPM | OXYGEN SATURATION: 97 % | HEIGHT: 66 IN | SYSTOLIC BLOOD PRESSURE: 130 MMHG | DIASTOLIC BLOOD PRESSURE: 87 MMHG | TEMPERATURE: 98 F

## 2024-02-21 DIAGNOSIS — R07.9 NONSPECIFIC CHEST PAIN: ICD-10-CM

## 2024-02-21 DIAGNOSIS — I10 HYPERTENSION, UNSPECIFIED TYPE: ICD-10-CM

## 2024-02-21 LAB
ALBUMIN SERPL BCG-MCNC: 4.4 G/DL (ref 3.5–5.2)
ALBUMIN UR-MCNC: NEGATIVE MG/DL
ALP SERPL-CCNC: 81 U/L (ref 40–150)
ALT SERPL W P-5'-P-CCNC: 11 U/L (ref 0–50)
ANION GAP SERPL CALCULATED.3IONS-SCNC: 13 MMOL/L (ref 7–15)
APPEARANCE UR: CLEAR
AST SERPL W P-5'-P-CCNC: 18 U/L (ref 0–45)
BACTERIA #/AREA URNS HPF: ABNORMAL /HPF
BASOPHILS # BLD AUTO: 0 10E3/UL (ref 0–0.2)
BASOPHILS NFR BLD AUTO: 1 %
BILIRUB SERPL-MCNC: 0.5 MG/DL
BILIRUB UR QL STRIP: NEGATIVE
BUN SERPL-MCNC: 10.9 MG/DL (ref 6–20)
CALCIUM SERPL-MCNC: 9.5 MG/DL (ref 8.6–10)
CHLORIDE SERPL-SCNC: 104 MMOL/L (ref 98–107)
COLOR UR AUTO: ABNORMAL
CREAT SERPL-MCNC: 0.86 MG/DL (ref 0.51–0.95)
DEPRECATED HCO3 PLAS-SCNC: 24 MMOL/L (ref 22–29)
EGFRCR SERPLBLD CKD-EPI 2021: 78 ML/MIN/1.73M2
EOSINOPHIL # BLD AUTO: 0.1 10E3/UL (ref 0–0.7)
EOSINOPHIL NFR BLD AUTO: 2 %
ERYTHROCYTE [DISTWIDTH] IN BLOOD BY AUTOMATED COUNT: 13.1 % (ref 10–15)
GLUCOSE SERPL-MCNC: 104 MG/DL (ref 70–99)
GLUCOSE UR STRIP-MCNC: NEGATIVE MG/DL
HCT VFR BLD AUTO: 38.6 % (ref 35–47)
HGB BLD-MCNC: 12.9 G/DL (ref 11.7–15.7)
HGB UR QL STRIP: NEGATIVE
IMM GRANULOCYTES # BLD: 0 10E3/UL
IMM GRANULOCYTES NFR BLD: 0 %
KETONES UR STRIP-MCNC: 20 MG/DL
LEUKOCYTE ESTERASE UR QL STRIP: NEGATIVE
LIPASE SERPL-CCNC: 16 U/L (ref 13–60)
LYMPHOCYTES # BLD AUTO: 2 10E3/UL (ref 0.8–5.3)
LYMPHOCYTES NFR BLD AUTO: 29 %
MCH RBC QN AUTO: 29.2 PG (ref 26.5–33)
MCHC RBC AUTO-ENTMCNC: 33.4 G/DL (ref 31.5–36.5)
MCV RBC AUTO: 87 FL (ref 78–100)
MONOCYTES # BLD AUTO: 0.6 10E3/UL (ref 0–1.3)
MONOCYTES NFR BLD AUTO: 9 %
MUCOUS THREADS #/AREA URNS LPF: PRESENT /LPF
NEUTROPHILS # BLD AUTO: 4.2 10E3/UL (ref 1.6–8.3)
NEUTROPHILS NFR BLD AUTO: 59 %
NITRATE UR QL: NEGATIVE
NRBC # BLD AUTO: 0 10E3/UL
NRBC BLD AUTO-RTO: 0 /100
NT-PROBNP SERPL-MCNC: 94 PG/ML (ref 0–900)
PH UR STRIP: 5 [PH] (ref 5–7)
PLATELET # BLD AUTO: 210 10E3/UL (ref 150–450)
POTASSIUM SERPL-SCNC: 4.1 MMOL/L (ref 3.4–5.3)
PROT SERPL-MCNC: 7.1 G/DL (ref 6.4–8.3)
RBC # BLD AUTO: 4.42 10E6/UL (ref 3.8–5.2)
RBC URINE: 0 /HPF
SODIUM SERPL-SCNC: 141 MMOL/L (ref 135–145)
SP GR UR STRIP: 1.01 (ref 1–1.03)
SQUAMOUS EPITHELIAL: <1 /HPF
TROPONIN T SERPL HS-MCNC: <6 NG/L
UROBILINOGEN UR STRIP-MCNC: NORMAL MG/DL
WBC # BLD AUTO: 7 10E3/UL (ref 4–11)
WBC URINE: 1 /HPF

## 2024-02-21 PROCEDURE — 93005 ELECTROCARDIOGRAM TRACING: CPT | Performed by: STUDENT IN AN ORGANIZED HEALTH CARE EDUCATION/TRAINING PROGRAM

## 2024-02-21 PROCEDURE — 36415 COLL VENOUS BLD VENIPUNCTURE: CPT | Performed by: STUDENT IN AN ORGANIZED HEALTH CARE EDUCATION/TRAINING PROGRAM

## 2024-02-21 PROCEDURE — 71260 CT THORAX DX C+: CPT

## 2024-02-21 PROCEDURE — 99284 EMERGENCY DEPT VISIT MOD MDM: CPT | Mod: 25 | Performed by: STUDENT IN AN ORGANIZED HEALTH CARE EDUCATION/TRAINING PROGRAM

## 2024-02-21 PROCEDURE — 250N000009 HC RX 250: Performed by: STUDENT IN AN ORGANIZED HEALTH CARE EDUCATION/TRAINING PROGRAM

## 2024-02-21 PROCEDURE — 83690 ASSAY OF LIPASE: CPT | Performed by: STUDENT IN AN ORGANIZED HEALTH CARE EDUCATION/TRAINING PROGRAM

## 2024-02-21 PROCEDURE — 81001 URINALYSIS AUTO W/SCOPE: CPT | Performed by: STUDENT IN AN ORGANIZED HEALTH CARE EDUCATION/TRAINING PROGRAM

## 2024-02-21 PROCEDURE — 85025 COMPLETE CBC W/AUTO DIFF WBC: CPT | Performed by: STUDENT IN AN ORGANIZED HEALTH CARE EDUCATION/TRAINING PROGRAM

## 2024-02-21 PROCEDURE — 84484 ASSAY OF TROPONIN QUANT: CPT | Performed by: STUDENT IN AN ORGANIZED HEALTH CARE EDUCATION/TRAINING PROGRAM

## 2024-02-21 PROCEDURE — 250N000011 HC RX IP 250 OP 636: Performed by: STUDENT IN AN ORGANIZED HEALTH CARE EDUCATION/TRAINING PROGRAM

## 2024-02-21 PROCEDURE — 83880 ASSAY OF NATRIURETIC PEPTIDE: CPT | Performed by: STUDENT IN AN ORGANIZED HEALTH CARE EDUCATION/TRAINING PROGRAM

## 2024-02-21 PROCEDURE — 93010 ELECTROCARDIOGRAM REPORT: CPT | Performed by: STUDENT IN AN ORGANIZED HEALTH CARE EDUCATION/TRAINING PROGRAM

## 2024-02-21 PROCEDURE — 80053 COMPREHEN METABOLIC PANEL: CPT | Performed by: STUDENT IN AN ORGANIZED HEALTH CARE EDUCATION/TRAINING PROGRAM

## 2024-02-21 PROCEDURE — 99285 EMERGENCY DEPT VISIT HI MDM: CPT | Mod: 25 | Performed by: STUDENT IN AN ORGANIZED HEALTH CARE EDUCATION/TRAINING PROGRAM

## 2024-02-21 RX ORDER — ERGOCALCIFEROL (VITAMIN D2) 10 MCG
TABLET ORAL
COMMUNITY

## 2024-02-21 RX ORDER — IOPAMIDOL 755 MG/ML
500 INJECTION, SOLUTION INTRAVASCULAR ONCE
Status: COMPLETED | OUTPATIENT
Start: 2024-02-21 | End: 2024-02-21

## 2024-02-21 RX ADMIN — SODIUM CHLORIDE 100 ML: 9 INJECTION, SOLUTION INTRAVENOUS at 22:09

## 2024-02-21 RX ADMIN — IOPAMIDOL 72 ML: 755 INJECTION, SOLUTION INTRAVENOUS at 22:09

## 2024-02-21 ASSESSMENT — ACTIVITIES OF DAILY LIVING (ADL)
ADLS_ACUITY_SCORE: 35
ADLS_ACUITY_SCORE: 35

## 2024-02-21 ASSESSMENT — COLUMBIA-SUICIDE SEVERITY RATING SCALE - C-SSRS
6. HAVE YOU EVER DONE ANYTHING, STARTED TO DO ANYTHING, OR PREPARED TO DO ANYTHING TO END YOUR LIFE?: NO
1. IN THE PAST MONTH, HAVE YOU WISHED YOU WERE DEAD OR WISHED YOU COULD GO TO SLEEP AND NOT WAKE UP?: NO
2. HAVE YOU ACTUALLY HAD ANY THOUGHTS OF KILLING YOURSELF IN THE PAST MONTH?: NO

## 2024-02-21 NOTE — Clinical Note
Claudette Burris was seen and treated in our emergency department on 2/21/2024.  She may return to work on 02/23/2024.       If you have any questions or concerns, please don't hesitate to call.      Cj Trivedi MD

## 2024-02-22 NOTE — ED TRIAGE NOTES
Back pain ongoing for a while but today mid sternal chest pain and some dizziness. HTN - does take BP meds, no recent changes.      Triage Assessment (Adult)       Row Name 02/21/24 2128          Triage Assessment    Airway WDL WDL        Respiratory WDL    Respiratory WDL WDL        Cardiac WDL    Cardiac WDL WDL

## 2024-02-22 NOTE — DISCHARGE INSTRUCTIONS
The exact cause of your pain and lightheadedness is still unclear.  However, your testing today was extremely reassuring.  I do not see any signs of heart stress on your EKG or lab work.  The CT scan showed no problems with your aorta, lungs, or any other obvious causes of discomfort.  I recommend that you follow-up with primary care soon as possible.  Referral was provided so you can set up with a new doctor.  Stay hydrated over the next several days and be deliberate with position changes, because sudden changes may be contributing to your dizziness.  I would also advise checking your blood pressure a few times daily and recording these numbers for your doctor to review.  Return to the emergency department in the meantime for any new or acutely worsening symptoms.

## 2024-02-22 NOTE — ED PROVIDER NOTES
History     Chief Complaint   Patient presents with    Chest Pain    Back Pain     HPI  Claudette Burris is a 58 year old female with history of hypertension, anxiety who presents for evaluation of back and chest pain.  Patient has had some left and mid back pain off and on for the last few weeks.  She has been exercising more recently and initially attributed the pain to that, possibly muscle spasm.  Then, over the last few days she has also developed intermittent episodes of substernal chest pressure.  This seems to occur mostly at night and does not clearly relate to exertion or deep breathing.  She has no known cardiac history or prior PE/DVT.  At times the pain radiates into her left neck as well.  Patient also has some lightheadedness today.  Otherwise denies headache, vertigo, focal numbness/tingling/weakness, shortness of breath, abdominal pain, pain or swelling of the legs, other complaints today.    Allergies:  Allergies   Allergen Reactions    Penicillins Rash       Problem List:    Patient Active Problem List    Diagnosis Date Noted    ASCUS of cervix with negative high risk HPV 04/19/2021     Priority: Medium     5/22/15 ASCUS pap, neg HR HPV. Plan: cotest in 3 years.   4/19/21 ASCUS pap, neg HR HPV. Plan: Cotest in 1 yr  5/11/22 NIL Pap, Neg HR HPV. Plan: Cotest in 1 yr.   6/19/23 NIL Pap, Neg HR HPV. Plan: cotest in 5 years.       Benign essential hypertension 03/15/2016     Priority: Medium    Vitamin D deficiency 10/29/2012     Priority: Medium    CARDIOVASCULAR SCREENING; LDL GOAL LESS THAN 160 10/31/2010     Priority: Medium    LLQ pain 09/30/2009     Priority: Medium    Ovarian cyst 09/30/2009     Priority: Medium    Generalized anxiety disorder 03/04/2009     Priority: Medium     Diagnosis updated by automated process. Provider to review and confirm.          Past Medical History:    Past Medical History:   Diagnosis Date    ASCUS favoring benign 5/22/15    Other and unspecified uterine  "inertia, with delivery 97       Past Surgical History:    Past Surgical History:   Procedure Laterality Date    COLONOSCOPY N/A 10/27/2015    Procedure: COMBINED COLONOSCOPY, SINGLE OR MULTIPLE BIOPSY/POLYPECTOMY BY BIOPSY;  Surgeon: Rohan Tirado MD;  Location:  GI    HC EXC BENIGN SKIN LESION TRUNK/ARM/LEG >4 CM  03/12/10    excision of left buttock scar    LAPAROSCOPIC CHOLECYSTECTOMY  2010    LAPAROSCOPIC CHOLECYSTECTOMY performed by EDER HURT at  OR    [facility] PART REMV BLADDER,REIMPLNT URETER  age 6       Family History:    Family History   Problem Relation Age of Onset    Hypertension Mother     Cerebrovascular Disease Maternal Grandfather     Heart Disease Maternal Grandmother         MI ,  at age 86    Osteoporosis Maternal Grandmother     Musculoskeletal Disorder Maternal Grandmother     Arthritis Maternal Grandmother         RA    Cancer Paternal Grandfather         Luekemia    Family History Negative Other        Social History:  Marital Status:   [2]  Social History     Tobacco Use    Smoking status: Never    Smokeless tobacco: Never   Vaping Use    Vaping Use: Never used   Substance Use Topics    Alcohol use: No     Comment: rare occ.    Drug use: No        Medications:    lisinopril (ZESTRIL) 5 MG tablet  multivitamin w/minerals (THERA-VIT-M) tablet  Vitamin D, Cholecalciferol, 10 MCG (400 UNIT) TABS  valACYclovir (VALTREX) 500 MG tablet      Review of Systems   All other systems reviewed and are negative.  See HPI.    Physical Exam   BP: (!) 163/116  Pulse: 75  Temp: 98  F (36.7  C)  Resp: 18  Height: 167.6 cm (5' 6\")  Weight: 73.5 kg (162 lb)  SpO2: 100 %      Physical Exam  Vitals and nursing note reviewed.   Constitutional:       General: She is not in acute distress.     Appearance: Normal appearance. She is well-developed. She is not diaphoretic.      Comments: Anxious, otherwise nontoxic/no acute distress.   HENT:      Head: Normocephalic and " atraumatic.      Mouth/Throat:      Mouth: Mucous membranes are moist.   Eyes:      General: No scleral icterus.     Extraocular Movements: Extraocular movements intact.      Conjunctiva/sclera: Conjunctivae normal.      Pupils: Pupils are equal, round, and reactive to light.   Neck:      Vascular: No JVD.   Cardiovascular:      Rate and Rhythm: Normal rate and regular rhythm.      Pulses:           Radial pulses are 2+ on the right side and 2+ on the left side.        Dorsalis pedis pulses are 2+ on the right side and 2+ on the left side.      Heart sounds: Normal heart sounds. No murmur heard.  Pulmonary:      Effort: Pulmonary effort is normal. No tachypnea or respiratory distress.      Breath sounds: Normal breath sounds. No decreased breath sounds, wheezing or rhonchi.      Comments: No respiratory distress.  Lungs clear to auscultation.  Speaking comfortably in full sentences.  Chest:      Chest wall: No mass or tenderness.   Abdominal:      General: Abdomen is flat.      Palpations: Abdomen is soft.      Tenderness: There is no abdominal tenderness. There is no guarding or rebound.   Musculoskeletal:         General: Normal range of motion.      Cervical back: Normal range of motion and neck supple.      Right lower leg: No tenderness. No edema.      Left lower leg: No tenderness. No edema.   Skin:     General: Skin is warm.      Capillary Refill: Capillary refill takes less than 2 seconds.      Coloration: Skin is not pale.      Findings: No rash.   Neurological:      General: No focal deficit present.      Mental Status: She is alert and oriented to person, place, and time.      Cranial Nerves: No cranial nerve deficit.      Motor: No weakness.   Psychiatric:         Mood and Affect: Mood is anxious.         ED Course             Procedures         EKG performed at 2133.  Sinus rhythm with frequent PVCs.  Rate 82.  Left axis deviation.  Normal intervals.  Nonspecific T wave changes.  Examined apparently  interpreted by me.       Results for orders placed or performed during the hospital encounter of 02/21/24 (from the past 24 hour(s))   CBC with platelets differential    Narrative    The following orders were created for panel order CBC with platelets differential.  Procedure                               Abnormality         Status                     ---------                               -----------         ------                     CBC with platelets and d...[[id number]]                      Final result                 Please view results for these tests on the individual orders.   Troponin T, High Sensitivity   Result Value Ref Range    Troponin T, High Sensitivity <6 <=14 ng/L   Comprehensive metabolic panel   Result Value Ref Range    Sodium 141 135 - 145 mmol/L    Potassium 4.1 3.4 - 5.3 mmol/L    Carbon Dioxide (CO2) 24 22 - 29 mmol/L    Anion Gap 13 7 - 15 mmol/L    Urea Nitrogen 10.9 6.0 - 20.0 mg/dL    Creatinine 0.86 0.51 - 0.95 mg/dL    GFR Estimate 78 >60 mL/min/1.73m2    Calcium 9.5 8.6 - 10.0 mg/dL    Chloride 104 98 - 107 mmol/L    Glucose 104 (H) 70 - 99 mg/dL    Alkaline Phosphatase 81 40 - 150 U/L    AST 18 0 - 45 U/L    ALT 11 0 - 50 U/L    Protein Total 7.1 6.4 - 8.3 g/dL    Albumin 4.4 3.5 - 5.2 g/dL    Bilirubin Total 0.5 <=1.2 mg/dL   Lipase   Result Value Ref Range    Lipase 16 13 - 60 U/L   Nt probnp inpatient (BNP)   Result Value Ref Range    N terminal Pro BNP Inpatient 94 0 - 900 pg/mL   CBC with platelets and differential   Result Value Ref Range    WBC Count 7.0 4.0 - 11.0 10e3/uL    RBC Count 4.42 3.80 - 5.20 10e6/uL    Hemoglobin 12.9 11.7 - 15.7 g/dL    Hematocrit 38.6 35.0 - 47.0 %    MCV 87 78 - 100 fL    MCH 29.2 26.5 - 33.0 pg    MCHC 33.4 31.5 - 36.5 g/dL    RDW 13.1 10.0 - 15.0 %    Platelet Count 210 150 - 450 10e3/uL    % Neutrophils 59 %    % Lymphocytes 29 %    % Monocytes 9 %    % Eosinophils 2 %    % Basophils 1 %    % Immature Granulocytes 0 %    NRBCs per 100 WBC 0  <1 /100    Absolute Neutrophils 4.2 1.6 - 8.3 10e3/uL    Absolute Lymphocytes 2.0 0.8 - 5.3 10e3/uL    Absolute Monocytes 0.6 0.0 - 1.3 10e3/uL    Absolute Eosinophils 0.1 0.0 - 0.7 10e3/uL    Absolute Basophils 0.0 0.0 - 0.2 10e3/uL    Absolute Immature Granulocytes 0.0 <=0.4 10e3/uL    Absolute NRBCs 0.0 10e3/uL   UA with Microscopic reflex to Culture    Specimen: Urine, Midstream   Result Value Ref Range    Color Urine Straw Colorless, Straw, Light Yellow, Yellow    Appearance Urine Clear Clear    Glucose Urine Negative Negative mg/dL    Bilirubin Urine Negative Negative    Ketones Urine 20 (A) Negative mg/dL    Specific Gravity Urine 1.008 1.003 - 1.035    Blood Urine Negative Negative    pH Urine 5.0 5.0 - 7.0    Protein Albumin Urine Negative Negative mg/dL    Urobilinogen Urine Normal Normal, 2.0 mg/dL    Nitrite Urine Negative Negative    Leukocyte Esterase Urine Negative Negative    Bacteria Urine Few (A) None Seen /HPF    Mucus Urine Present (A) None Seen /LPF    RBC Urine 0 <=2 /HPF    WBC Urine 1 <=5 /HPF    Squamous Epithelials Urine <1 <=1 /HPF    Narrative    Urine Culture not indicated   CT Aortic Survey w Contrast    Narrative    EXAM: CT AORTIC SURVEY W CONTRAST  LOCATION: [facility]  DATE: 2/21/2024    INDICATION: Substernal chest pain radiating to the left back and shoulder. Hypertension.  COMPARISON: None.  TECHNIQUE: CT angiogram chest abdomen pelvis during arterial phase of injection of IV contrast. Dose reduction techniques were used.   CONTRAST: Isovue 370, 72mL    FINDINGS:   CT ANGIOGRAM CHEST, ABDOMEN, AND PELVIS:   Pulmonary Arteries: The pulmonary arteries are well-opacified with contrast. No filling defects are seen to suggest thromboembolism.    Thoracic Aorta: Patent and normal in caliber. No evidence of dissection, intramural hematoma, or penetrating ulcer.    Abdominal Aorta: Patent and normal in caliber. Mild aortic calcifications. No evidence of  dissection or penetrating ulcer.    Celiac Artery: Patent.  Superior Mesenteric Artery: Patent.  Right Renal Artery: Patent.  Left Renal Artery: Patent.  Inferior Mesenteric Artery: Patent.    Right Common Iliac Artery: Patent.  Right External Iliac Artery: Patent.  Right Internal Iliac Artery: Patent.  Right Common Femoral Artery: Patent.  Proximal Right Superficial Femoral Artery: Patent.  Proximal Right Deep Femoral Artery: Patent.    Left Common Iliac Artery: Patent.  Left External Iliac Artery: Patent.  Left Internal Iliac Artery: Patent.  Left Common Femoral Artery: Patent.  Proximal Left Superficial Femoral Artery: Patent.  Proximal Left Deep Femoral Artery: Patent.    LUNGS AND PLEURA: No focal airspace disease. No pleural effusion or pneumothorax.    MEDIASTINUM/AXILLAE: No lymphadenopathy. No pericardial effusion.    CORONARY ARTERY CALCIFICATION: None.    HEPATOBILIARY: Liver appears normal. Status post cholecystectomy. No biliary dilatation.    PANCREAS: Normal.    SPLEEN: Normal.    ADRENAL GLANDS: Normal.    KIDNEYS/BLADDER: Normal.    BOWEL: No obstruction or inflammatory change. Normal appendix. No evidence of acute appendicitis.    LYMPH NODES: No lymphadenopathy.    PELVIC ORGANS: Unremarkable.    MUSCULOSKELETAL: Mild multilevel degenerative changes of the spine.      Impression    IMPRESSION:  1.  No aortic dissection or acute aortic pathology.    2.  No acute findings in the chest, abdomen, or pelvis.         Medications   CT Saline Flush 100mL (100 mLs As instructed $Given 2/21/24 2209)   iopamidol (ISOVUE-370) solution 500 mL (72 mLs Intravenous $Given 2/21/24 2209)       Assessments & Plan (with Medical Decision Making)     I have reviewed the nursing notes.    I have reviewed the findings, diagnosis, plan and need for follow up with the patient.    Medical Decision Making  Claudette Burris is a 58 year old female with history of hypertension, anxiety who presents for evaluation of back and  chest pain.  Hypertensive on arrival, 163/116.  Vitals otherwise normal.  She appeared somewhat anxious on exam but was otherwise nontoxic and in no acute distress.  Neurological exam is entirely nonfocal.  Lungs are clear to auscultation and pulses are equal in all extremities.  Her back pain sounds more musculoskeletal in nature, though she does not have any obvious focal areas of tenderness to the midline or paraspinal musculature.  Cause of chest pain is less clear, does not seem clearly related to exertion or anginal in nature.  However, with the combination of chest pain radiating into the back and hypertension, differential would include aortic pathology or PE.  She declined pain medication.  We discussed various options for workup here in the emergency department and she elected to proceed with CT imaging directly instead of labs and chest x-ray.  I think this is reasonable given her combination of symptoms described above.    Lab tests were very reassuring.  She has no leukocytosis or anemia.  Electrolytes and transaminases were within normal limits.  Troponin and BNP were negative, EKG showed no acute ischemic changes.  CT aortic study showed no acute pathology, specifically no signs of dissection, PE, or other acute intra-abdominal abnormalities.  Since she has had pain for over a day, I would have expected troponin to be elevated with any acute cardiac cause.  Patient had some elevated blood pressures on arrival, but there are no signs of endorgan damage on labs.  It is possible that anxiety is playing a part as well.  Patient also describes some lightheadedness off and on, but seems to relate this to position changes.  She certainly could have some orthostatic dizziness.  Advised that she rest and drink plenty of fluids over the next several days, follow-up with her PCP is soon as possible.  Patient agrees to return to the emergency department sooner for any new or acutely worsening  symptoms.    Discharge Medication List as of 2/21/2024 11:36 PM          Final diagnoses:   Nonspecific chest pain   Hypertension, unspecified type       2/21/2024   [facility] EMERGENCY DEPT       Cj Trivedi MD  02/22/24 0212

## 2024-07-23 ENCOUNTER — HOSPITAL ENCOUNTER (EMERGENCY)
Facility: CLINIC | Age: 59
End: 2024-07-23
Payer: COMMERCIAL

## 2024-08-03 ENCOUNTER — HEALTH MAINTENANCE LETTER (OUTPATIENT)
Age: 59
End: 2024-08-03

## 2024-08-12 ENCOUNTER — HOSPITAL ENCOUNTER (OUTPATIENT)
Dept: MAMMOGRAPHY | Facility: CLINIC | Age: 59
Discharge: HOME | End: 2024-08-12
Attending: FAMILY MEDICINE | Admitting: FAMILY MEDICINE
Payer: COMMERCIAL

## 2024-08-12 DIAGNOSIS — Z12.31 VISIT FOR SCREENING MAMMOGRAM: ICD-10-CM

## 2024-08-12 PROCEDURE — 77063 BREAST TOMOSYNTHESIS BI: CPT

## 2024-08-14 ENCOUNTER — LAB (OUTPATIENT)
Dept: LAB | Facility: CLINIC | Age: 59
End: 2024-08-14
Payer: COMMERCIAL

## 2024-08-14 DIAGNOSIS — E78.5 HYPERLIPIDEMIA LDL GOAL <100: ICD-10-CM

## 2024-08-14 DIAGNOSIS — R73.01 IMPAIRED FASTING GLUCOSE: ICD-10-CM

## 2024-08-14 LAB
ANION GAP SERPL CALCULATED.3IONS-SCNC: 12 MMOL/L (ref 7–15)
BUN SERPL-MCNC: 8.3 MG/DL (ref 8–23)
CALCIUM SERPL-MCNC: 9.8 MG/DL (ref 8.8–10.4)
CHLORIDE SERPL-SCNC: 104 MMOL/L (ref 98–107)
CHOLEST SERPL-MCNC: 261 MG/DL
CREAT SERPL-MCNC: 0.95 MG/DL (ref 0.51–0.95)
EGFRCR SERPLBLD CKD-EPI 2021: 69 ML/MIN/1.73M2
FASTING STATUS PATIENT QL REPORTED: YES
FASTING STATUS PATIENT QL REPORTED: YES
GLUCOSE SERPL-MCNC: 101 MG/DL (ref 70–99)
HCO3 SERPL-SCNC: 26 MMOL/L (ref 22–29)
HDLC SERPL-MCNC: 63 MG/DL
LDLC SERPL CALC-MCNC: 166 MG/DL
NONHDLC SERPL-MCNC: 198 MG/DL
POTASSIUM SERPL-SCNC: 4.1 MMOL/L (ref 3.4–5.3)
SODIUM SERPL-SCNC: 142 MMOL/L (ref 135–145)
TRIGL SERPL-MCNC: 162 MG/DL

## 2024-08-14 PROCEDURE — 80048 BASIC METABOLIC PNL TOTAL CA: CPT

## 2024-08-14 PROCEDURE — 80061 LIPID PANEL: CPT

## 2024-08-14 PROCEDURE — 36415 COLL VENOUS BLD VENIPUNCTURE: CPT

## 2024-08-15 ENCOUNTER — OFFICE VISIT (OUTPATIENT)
Dept: FAMILY MEDICINE | Facility: CLINIC | Age: 59
End: 2024-08-15
Payer: COMMERCIAL

## 2024-08-15 VITALS
WEIGHT: 151.2 LBS | OXYGEN SATURATION: 99 % | TEMPERATURE: 98.9 F | HEIGHT: 67 IN | BODY MASS INDEX: 23.73 KG/M2 | SYSTOLIC BLOOD PRESSURE: 112 MMHG | DIASTOLIC BLOOD PRESSURE: 74 MMHG | RESPIRATION RATE: 12 BRPM | HEART RATE: 60 BPM

## 2024-08-15 DIAGNOSIS — Z71.89 ADVANCED DIRECTIVES, COUNSELING/DISCUSSION: ICD-10-CM

## 2024-08-15 DIAGNOSIS — I10 BENIGN ESSENTIAL HYPERTENSION: Primary | ICD-10-CM

## 2024-08-15 DIAGNOSIS — M54.9 UPPER BACK PAIN: ICD-10-CM

## 2024-08-15 DIAGNOSIS — F41.9 ANXIETY: ICD-10-CM

## 2024-08-15 PROCEDURE — 99214 OFFICE O/P EST MOD 30 MIN: CPT | Performed by: NURSE PRACTITIONER

## 2024-08-15 PROCEDURE — G2211 COMPLEX E/M VISIT ADD ON: HCPCS | Performed by: NURSE PRACTITIONER

## 2024-08-15 RX ORDER — LISINOPRIL 10 MG/1
10 TABLET ORAL DAILY
Qty: 90 TABLET | Refills: 0 | Status: SHIPPED | OUTPATIENT
Start: 2024-08-15 | End: 2024-09-12

## 2024-08-15 ASSESSMENT — PAIN SCALES - GENERAL: PAINLEVEL: MILD PAIN (2)

## 2024-08-15 NOTE — PATIENT INSTRUCTIONS
General Instructions:  If you have been seen for a concern and are worse or not improving, please schedule a follow-up or reach out to a member of our care team or nurses if it is urgent.   Nurse advice is available 24/7 by calling [phone].  For emergencies, please call [phone].    You may see your results before we can make recommendations. This is common, as sometimes we are awaiting labs to return or we are out of office on a particular day. Please be patient, and if you don't see a response from me or one of my colleagues within 2-3 business days, and you have a specific concern, please send us a message.     If you have run out of refills, please schedule follow-up visits. This is generally an indication for when you are due for a follow-up visit. Most mental health or chronic issues we are treating require some type of visit every 6 months. We are now offering many issues to be done through telephone or video visits! However, if exams are needed or it is a complex concern, we may ask you to be seen in person.    Physicals/Preventative exam slots fill up fast. Please consider scheduling these 2-3 months in advance to allow for the appointment time that fits you best. If you have medications ordered or other issues addressed at these visits, please be aware there are extra costs associated with this.     Sincerely,   Flor Blas [facility] Team

## 2024-08-15 NOTE — PROGRESS NOTES
Assessment & Plan     Benign essential hypertension  -When we reviewed her past visits and symptoms.  She does agree that the symptoms have been very similar.  She has been very worried about her blood pressure.  On recheck it is very normal.  Patient is able to sit and be calm and have a nice low blood pressure readings.  Discussed switching dosing to lisinopril to 10 mg daily.  Offered addition of beta-blocker to help with fighter flight response and did have somewhat of a calming nature.  Patient will consider this.  - lisinopril (ZESTRIL) 10 MG tablet; Take 1 tablet (10 mg) by mouth daily    Advanced directives, counseling/discussion  Declines need for form.    Anxiety  Patient feels that anxiety has been on her mind about her cholesterol and her blood pressure and she has been very focused on this.  We discussed several options including not checking this as much, counseling, self distressing and relaxation.  We also discussed the beta-blocker above may be beneficial for her.  Patient will think about options and get back to us on her visit with her PCP in 1 month.    Upper back pain  -Unable to reproduce her pain today exam was very reassuring.  Offered physical therapy.  Patient declined and will do home exercise program at this time.    We also discussed her hyperlipidemia and home treatments for this.  Patient would like to work on nutrition and exercise.  We discussed whole grains and plant based oils versus animal fats and red meat.  Continue exercise.  Recheck with her upcoming physical in 1 month with labs, consideration of statin due to family history.    Return to clinic with any new or worsening symptoms, and as needed.              MEDICATIONS:        - Continue other medications without change    The longitudinal plan of care for the diagnosis(es)/condition(s) as documented were addressed during this visit. Due to the added complexity in care, I will continue to support Claudette in the subsequent  management and with ongoing continuity of care.    Jessy Wilkins is a 59 year old, presenting for the following health issues:  Musculoskeletal Problem (Back pain, also BP and labs check)        1/24/2024     3:41 PM   Additional Questions   Roomed by luisa   Accompanied by alone     Musculoskeletal Problem    History of Present Illness       Back Pain:  She presents for follow up of back pain. Patient's back pain is a new problem.    Original cause of back pain: not sure  First noticed back pain: yesterday  Patient feels back pain: comes and goesLocation of back pain:  Left middle of back  Description of back pain: dull ache and fullness  Back pain spreads: nowhere    Since patient first noticed back pain, pain is: gradually worsening  Does back pain interfere with her job:  No  On a scale of 1-10 (10 being the worst), patient describes pain as:  3  What makes back pain worse: certain positions   Acupuncture: not tried  Acetaminophen: not tried  Activity or exercise: helpful  Chiropractor:  Not tried  Cold: not tried  Heat: helpful  Massage: helpful  Muscle relaxants: not tried  NSAIDS: not tried  Opioids: not tried  Physical Therapy: not tried  Rest: helpful  Steroid Injection: not tried  Stretching: helpful  Surgery: not tried  TENS unit: not tried  Topical pain relievers: not tried  Other healthcare providers patient is seeing for back pain: None    Reason for visit:  Bp  Symptom onset:  3-4 weeks ago    She eats 2-3 servings of fruits and vegetables daily.She consumes 0 sweetened beverage(s) daily.She exercises with enough effort to increase her heart rate 30 to 60 minutes per day.  She exercises with enough effort to increase her heart rate 4 days per week. She is missing 2 dose(s) of medications per week.         Medication Followup of BP/cholesterol   Taking Medication as prescribed: yes  Side Effects:  None  Medication Helping Symptoms:  helping but home BP machine is reading high, unsure if it's a  problem with the machine, will check BP using tower in clinic  [place] measured BP at 133/89, pt's home machine has been consistently reading over 100 diastolic  Pain History:  When did you first notice your pain? yesterday   Have you seen anyone else for your pain? No  How has your pain affected your ability to work? 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  Where in your body do you have pain? Back Pain  Onset/Duration: yesterday  Description:   Location of pain: upper back left, behind L shoulder blade  Character of pain: dull ache  Pain radiation: none  New numbness or weakness in legs, not attributed to pain: no   Intensity: Currently 2/10, At its worst 3/10  Progression of Symptoms: same  History:   Specific cause: none, wondering if stress related  Pain interferes with job: No  History of back problems: no prior back problems  Any previous MRI or X-rays: None  Sees a specialist for back pain: No  Alleviating factors:   Improved by: heat , bath, stretching  Precipitating factors:  Worsened by: Nothing  Therapies tried and outcome: bath helped, no OTC    Accompanying Signs & Symptoms:  Risk of Fracture: None  Risk of Cauda Equina: None  Risk of Infection: None  Risk of Cancer: None  Risk of Ankylosing Spondylitis: Onset at age <35, male, AND morning back stiffness  no       Patient relates she has been very anxious about her health.  She did receive her lipids that were quite elevated.  Her brother who is not very healthy with his lifestyle did have quadruple bypass at 50.  She thought her nutrition was better than that.  She has been exercising without issue.  Patient has no exertional symptoms.  Patient had a rather extensive workup in the emergency department for atypical upper back pain and elevated blood pressure.  This occurred in February and she had a quite extensive workup including imaging of her aorta which was normal.    Patient has been checking her  "blood pressures throughout the day.  She was initially placed on twice daily lisinopril 5 mg.  She feels like she tolerates this well.  Patient has not's been seated for 5 minutes prior to checking.  With evaluation of her blood pressure monitor with her readings today-they do not seem to correlate very closely.      Patient notes that she also had similar reports of her left upper back pain throughout 2023 as well.  She denies any specific injury.              Review of Systems  Constitutional, HEENT, cardiovascular, pulmonary, gi and gu systems are negative, except as otherwise noted.      Objective    /70   Pulse 77   Temp 98.9  F (37.2  C) (Temporal)   Resp 12   Ht 1.7 m (5' 6.93\")   Wt 68.6 kg (151 lb 3.2 oz)   LMP 10/06/2015 (Approximate)   SpO2 99%   BMI 23.73 kg/m    Body mass index is 23.73 kg/m .  Physical Exam   GENERAL: alert and no distress  EYES: Eyes grossly normal to inspection, PERRL and conjunctivae and sclerae normal  HENT: ear canals and TM's normal, nose and mouth without ulcers or lesions  NECK: no adenopathy, no asymmetry, masses, or scars  RESP: lungs clear to auscultation - no rales, rhonchi or wheezes  CV: regular rate and rhythm, normal S1 S2, no S3 or S4, no murmur, click or rub, no peripheral edema  MS: no gross musculoskeletal defects noted, no edema no overall vertebral tenderness.  No, tenderness to chest wall with compression.  Patient does have madhu-fascicle shoulder trapezius/ tightness bilaterally.  NEURO: Normal strength and tone, mentation intact and speech normal  PSYCH: mentation appears normal, affect normal/bright, anxious, judgement and insight intact, and appearance well groomed            Signed Electronically by: ANA Higgins CNP    "

## 2024-09-12 ENCOUNTER — OFFICE VISIT (OUTPATIENT)
Dept: FAMILY MEDICINE | Facility: OTHER | Age: 59
End: 2024-09-12
Payer: COMMERCIAL

## 2024-09-12 VITALS
BODY MASS INDEX: 24.33 KG/M2 | HEIGHT: 67 IN | TEMPERATURE: 97.2 F | OXYGEN SATURATION: 97 % | SYSTOLIC BLOOD PRESSURE: 120 MMHG | DIASTOLIC BLOOD PRESSURE: 80 MMHG | RESPIRATION RATE: 16 BRPM | HEART RATE: 63 BPM | WEIGHT: 155 LBS

## 2024-09-12 DIAGNOSIS — Z00.00 ROUTINE GENERAL MEDICAL EXAMINATION AT A HEALTH CARE FACILITY: Primary | ICD-10-CM

## 2024-09-12 DIAGNOSIS — I10 BENIGN ESSENTIAL HYPERTENSION: ICD-10-CM

## 2024-09-12 DIAGNOSIS — Z71.84 TRAVEL ADVICE ENCOUNTER: ICD-10-CM

## 2024-09-12 DIAGNOSIS — E78.5 HYPERLIPIDEMIA LDL GOAL <100: ICD-10-CM

## 2024-09-12 PROCEDURE — 90471 IMMUNIZATION ADMIN: CPT | Performed by: FAMILY MEDICINE

## 2024-09-12 PROCEDURE — 99396 PREV VISIT EST AGE 40-64: CPT | Mod: 25 | Performed by: FAMILY MEDICINE

## 2024-09-12 PROCEDURE — 90673 RIV3 VACCINE NO PRESERV IM: CPT | Performed by: FAMILY MEDICINE

## 2024-09-12 PROCEDURE — 99214 OFFICE O/P EST MOD 30 MIN: CPT | Mod: 25 | Performed by: FAMILY MEDICINE

## 2024-09-12 PROCEDURE — 90746 HEPB VACCINE 3 DOSE ADULT IM: CPT | Performed by: FAMILY MEDICINE

## 2024-09-12 PROCEDURE — 90472 IMMUNIZATION ADMIN EACH ADD: CPT | Performed by: FAMILY MEDICINE

## 2024-09-12 RX ORDER — LISINOPRIL 10 MG/1
10 TABLET ORAL DAILY
Qty: 90 TABLET | Refills: 0 | Status: SHIPPED | OUTPATIENT
Start: 2024-09-12

## 2024-09-12 RX ORDER — SIMVASTATIN 20 MG
20 TABLET ORAL AT BEDTIME
Qty: 90 TABLET | Refills: 1 | Status: SHIPPED | OUTPATIENT
Start: 2024-09-12

## 2024-09-12 ASSESSMENT — PAIN SCALES - GENERAL: PAINLEVEL: NO PAIN (0)

## 2024-09-12 NOTE — PATIENT INSTRUCTIONS
Please schedule travel clinic encounter    Patient Education   Preventive Care Advice   This is general advice given by our system to help you stay healthy. However, your care team may have specific advice just for you. Please talk to your care team about your preventive care needs.  Nutrition  Eat 5 or more servings of fruits and vegetables each day.  Try wheat bread, brown rice and whole grain pasta (instead of white bread, rice, and pasta).  Get enough calcium and vitamin D. Check the label on foods and aim for 100% of the RDA (recommended daily allowance).  Lifestyle  Exercise at least 150 minutes each week  (30 minutes a day, 5 days a week).  Do muscle strengthening activities 2 days a week. These help control your weight and prevent disease.  No smoking.  Wear sunscreen to prevent skin cancer.  Have a dental exam and cleaning every 6 months.  Yearly exams  See your health care team every year to talk about:  Any changes in your health.  Any medicines your care team has prescribed.  Preventive care, family planning, and ways to prevent chronic diseases.  Shots (vaccines)   HPV shots (up to age 26), if you've never had them before.  Hepatitis B shots (up to age 59), if you've never had them before.  COVID-19 shot: Get this shot when it's due.  Flu shot: Get a flu shot every year.  Tetanus shot: Get a tetanus shot every 10 years.  Pneumococcal, hepatitis A, and RSV shots: Ask your care team if you need these based on your risk.  Shingles shot (for age 50 and up)  General health tests  Diabetes screening:  Starting at age 35, Get screened for diabetes at least every 3 years.  If you are younger than age 35, ask your care team if you should be screened for diabetes.  Cholesterol test: At age 39, start having a cholesterol test every 5 years, or more often if advised.  Bone density scan (DEXA): At age 50, ask your care team if you should have this scan for osteoporosis (brittle bones).  Hepatitis C: Get tested at  least once in your life.  STIs (sexually transmitted infections)  Before age 24: Ask your care team if you should be screened for STIs.  After age 24: Get screened for STIs if you're at risk. You are at risk for STIs (including HIV) if:  You are sexually active with more than one person.  You don't use condoms every time.  You or a partner was diagnosed with a sexually transmitted infection.  If you are at risk for HIV, ask about PrEP medicine to prevent HIV.  Get tested for HIV at least once in your life, whether you are at risk for HIV or not.  Cancer screening tests  Cervical cancer screening: If you have a cervix, begin getting regular cervical cancer screening tests starting at age 21.  Breast cancer scan (mammogram): If you've ever had breasts, begin having regular mammograms starting at age 40. This is a scan to check for breast cancer.  Colon cancer screening: It is important to start screening for colon cancer at age 45.  Have a colonoscopy test every 10 years (or more often if you're at risk) Or, ask your provider about stool tests like a FIT test every year or Cologuard test every 3 years.  To learn more about your testing options, visit:   .  For help making a decision, visit:   https://bit.ly/sa36153.  Prostate cancer screening test: If you have a prostate, ask your care team if a prostate cancer screening test (PSA) at age 55 is right for you.  Lung cancer screening: If you are a current or former smoker ages 50 to 80, ask your care team if ongoing lung cancer screenings are right for you.  For informational purposes only. Not to replace the advice of your health care provider. Copyright   2023 [employer]. All rights reserved. Clinically reviewed by the [facility]itions Program. [employer] [id number] - REV 01/24.      by a licensed therapist to address functional deficits as outlined in the established plan of care. Certification required: [] Yes - See Physician Certification below.       [x] No       Time in: 1100  Time out: 1130  Untimed treatment: 5  Timed treatment: 25  Total time: Vasyl [address], [address]

## 2024-09-12 NOTE — PROGRESS NOTES
Problem: Communication  Goal: The ability to communicate needs accurately and effectively will improve  Outcome: PROGRESSING AS EXPECTED     Problem: Safety  Goal: Will remain free from injury  Outcome: PROGRESSING AS EXPECTED      Preventive Care Visit  [facility]  Karla Frausto MD, MD, Family Medicine  Sep 12, 2024      Assessment & Plan         ICD-10-CM    1. Routine general medical examination at a health care facility  Z00.00       2. Benign essential hypertension  I10 lisinopril (ZESTRIL) 10 MG tablet      3. Hyperlipidemia LDL goal <100  E78.5 Lipid panel reflex to direct LDL Fasting     simvastatin (ZOCOR) 20 MG tablet      4. Travel advice encounter  Z71.84 Travel Clinic Referral          Cholesterol was increased and has been working on diet.  Has been trying to lose weight though acknowledges she was not particularly careful about how she was preparing foods with fat and better and will try and monitor this little closer but we did talk about given her numbers at this time she might actually do better with medication correction and then lifestyle works to maintain her.  She also had an increase in blood pressure medications within the last year though is doing well at this time to her lifestyle work is important in multiple fields.  Cholesterol med visit agreed to start and she will recheck in 8 weeks to see how this is going.  Patient is also planning to visit to [facility] and was unaware of the required yellow fever vaccination and travel clinic needs as we did review the CDC's travel guidelines and started with the hepatitis B vaccination series today.    I spent a total of 36 minutes on the day of the visit.   Time spent by me doing chart review, history and exam, documentation and further activities per the note    Karla Frausto MD     Patient has been advised of split billing requirements and indicates understanding: Yes        Counseling  Appropriate preventive services were addressed with this patient via screening, questionnaire, or discussion as appropriate for fall prevention, nutrition, physical activity, Tobacco-use cessation, social engagement, weight loss and cognition.  Checklist reviewing  preventive services available has been given to the patient.  Reviewed patient's diet, addressing concerns and/or questions.           Jessy Wilkins is a 59 year old, presenting for the following:  Physical        9/12/2024     7:17 AM   Additional Questions   Roomed by luisa   Accompanied by alone         9/12/2024     7:17 AM   Patient Reported Additional Medications   Patient reports taking the following new medications none        Health Care Directive  Patient does not have a Health Care Directive or Living Will: Patient states has Advance Directive and will bring in a copy to clinic.    HPI      Wants to talk about cholesterol blood pressure and weight as well as traveling to [facility]        9/12/2024   General Health   How would you rate your overall physical health? Good   Feel stress (tense, anxious, or unable to sleep) Not at all            9/12/2024   Nutrition   Three or more servings of calcium each day? Yes   Diet: Regular (no restrictions)   How many servings of fruit and vegetables per day? (!) 2-3   How many sweetened beverages each day? 0-1            9/12/2024   Exercise   Days per week of moderate/strenous exercise 4 days            9/12/2024   Social Factors   Frequency of gathering with friends or relatives Once a week   Worry food won't last until get money to buy more No   Food not last or not have enough money for food? No   Do you have housing? (Housing is defined as stable permanent housing and does not include staying ouside in a car, in a tent, in an abandoned building, in an overnight shelter, or couch-surfing.) Yes   Are you worried about losing your housing? No   Lack of transportation? No   Unable to get utilities (heat,electricity)? No            9/12/2024   Fall Risk   Fallen 2 or more times in the past year? No   Trouble with walking or balance? No             9/12/2024   Dental   Dentist two times every year? Yes            9/12/2024   TB Screening   Were you born  outside of the [place]? No                  9/12/2024   Substance Use   Alcohol more than 3/day or more than 7/wk No   Do you use any other substances recreationally? No        Social History     Tobacco Use    Smoking status: Never     Passive exposure: Never    Smokeless tobacco: Never   Vaping Use    Vaping status: Never Used   Substance Use Topics    Alcohol use: No     Comment: rare occ.    Drug use: No           8/12/2024   LAST FHS-7 RESULTS   1st degree relative breast or ovarian cancer No   Any relative bilateral breast cancer No   Any male have breast cancer No   Any ONE woman have BOTH breast AND ovarian cancer No   Any woman with breast cancer before 50yrs No   2 or more relatives with breast AND/OR ovarian cancer No   2 or more relatives with breast AND/OR bowel cancer No           Mammogram Screening - Mammogram every 1-2 years updated in Health Maintenance based on mutual decision making        9/12/2024   STI Screening   New sexual partner(s) since last STI/HIV test? No        History of abnormal Pap smear: YES - reflected in Problem List and Health Maintenance accordingly        Latest Ref Rng & Units 6/19/2023     1:47 PM 5/11/2022    11:55 AM 4/19/2021    10:49 AM   PAP / HPV   PAP  Negative for Intraepithelial Lesion or Malignancy (NILM)  Negative for Intraepithelial Lesion or Malignancy (NILM)     PAP (Historical)    ASC-US    HPV 16 DNA Negative Negative  Negative     HPV 18 DNA Negative Negative  Negative     Other HR HPV Negative Negative  Negative       ASCVD Risk   The 10-year ASCVD risk score (Myra PRINCE, et al., 2019) is: 4%    Values used to calculate the score:      Age: 59 years      Sex: Female      Is Non- : No      Diabetic: No      Tobacco smoker: No      Systolic Blood Pressure: 120 mmHg      Is BP treated: Yes      HDL Cholesterol: 63 mg/dL      Total Cholesterol: 261 mg/dL           Reviewed and updated as needed this visit by Provider   Tobacco   "Allergies  Meds  Problems  Med Hx  Surg Hx  Fam Hx                  Review of Systems  Constitutional, HEENT, cardiovascular, pulmonary, GI, , musculoskeletal, neuro, skin, endocrine and psych systems are negative, except as otherwise noted.     Objective    Exam  /80   Pulse 63   Temp 97.2  F (36.2  C) (Temporal)   Resp 16   Ht 1.691 m (5' 6.58\")   Wt 70.3 kg (155 lb)   LMP 10/06/2015 (Approximate)   SpO2 97%   BMI 24.59 kg/m     Estimated body mass index is 24.59 kg/m  as calculated from the following:    Height as of this encounter: 1.691 m (5' 6.58\").    Weight as of this encounter: 70.3 kg (155 lb).    Physical Exam  GENERAL: alert and no distress  HENT: ear canals and TM's normal, nose and mouth without ulcers or lesions  NECK: no adenopathy, no asymmetry, masses, or scars  RESP: lungs clear to auscultation - no rales, rhonchi or wheezes  CV: regular rate and rhythm, normal S1 S2, no S3 or S4, no murmur, click or rub, no peripheral edema  ABDOMEN: soft, nontender, no hepatosplenomegaly, no masses and bowel sounds normal  MS: no gross musculoskeletal defects noted, no edema  SKIN: no suspicious lesions or rashes  NEURO: Normal strength and tone, mentation intact and speech normal  PSYCH: mentation appears normal, affect normal/bright        Signed Electronically by: Karla Frausto MD, MD    "

## 2024-11-21 ENCOUNTER — OFFICE VISIT (OUTPATIENT)
Dept: FAMILY MEDICINE | Facility: CLINIC | Age: 59
End: 2024-11-21
Payer: COMMERCIAL

## 2024-11-21 VITALS
TEMPERATURE: 97.7 F | DIASTOLIC BLOOD PRESSURE: 90 MMHG | SYSTOLIC BLOOD PRESSURE: 148 MMHG | HEIGHT: 67 IN | BODY MASS INDEX: 24.58 KG/M2 | WEIGHT: 156.6 LBS | RESPIRATION RATE: 16 BRPM | HEART RATE: 65 BPM | OXYGEN SATURATION: 100 %

## 2024-11-21 DIAGNOSIS — Z01.84 IMMUNITY STATUS TESTING: ICD-10-CM

## 2024-11-21 DIAGNOSIS — Z71.84 TRAVEL ADVICE ENCOUNTER: Primary | ICD-10-CM

## 2024-11-21 PROCEDURE — 90472 IMMUNIZATION ADMIN EACH ADD: CPT | Mod: GA | Performed by: NURSE PRACTITIONER

## 2024-11-21 PROCEDURE — 86765 RUBEOLA ANTIBODY: CPT | Mod: GA | Performed by: NURSE PRACTITIONER

## 2024-11-21 PROCEDURE — 90746 HEPB VACCINE 3 DOSE ADULT IM: CPT | Mod: GA | Performed by: NURSE PRACTITIONER

## 2024-11-21 PROCEDURE — 90691 TYPHOID VACCINE IM: CPT | Mod: GA | Performed by: NURSE PRACTITIONER

## 2024-11-21 PROCEDURE — 91320 SARSCV2 VAC 30MCG TRS-SUC IM: CPT | Performed by: NURSE PRACTITIONER

## 2024-11-21 PROCEDURE — 36415 COLL VENOUS BLD VENIPUNCTURE: CPT | Mod: GA | Performed by: NURSE PRACTITIONER

## 2024-11-21 PROCEDURE — 99401 PREV MED CNSL INDIV APPRX 15: CPT | Mod: 25 | Performed by: NURSE PRACTITIONER

## 2024-11-21 PROCEDURE — 90480 ADMN SARSCOV2 VAC 1/ONLY CMP: CPT | Performed by: NURSE PRACTITIONER

## 2024-11-21 PROCEDURE — 86762 RUBELLA ANTIBODY: CPT | Mod: GA | Performed by: NURSE PRACTITIONER

## 2024-11-21 PROCEDURE — 90471 IMMUNIZATION ADMIN: CPT | Mod: GA | Performed by: NURSE PRACTITIONER

## 2024-11-21 PROCEDURE — 86735 MUMPS ANTIBODY: CPT | Mod: GA | Performed by: NURSE PRACTITIONER

## 2024-11-21 PROCEDURE — 90632 HEPA VACCINE ADULT IM: CPT | Mod: GA | Performed by: NURSE PRACTITIONER

## 2024-11-21 RX ORDER — AZITHROMYCIN 500 MG/1
500 TABLET, FILM COATED ORAL DAILY
Qty: 3 TABLET | Refills: 0 | Status: SHIPPED | OUTPATIENT
Start: 2024-11-21 | End: 2024-11-24

## 2024-11-21 ASSESSMENT — ANXIETY QUESTIONNAIRES
7. FEELING AFRAID AS IF SOMETHING AWFUL MIGHT HAPPEN: NOT AT ALL
2. NOT BEING ABLE TO STOP OR CONTROL WORRYING: NOT AT ALL
4. TROUBLE RELAXING: NOT AT ALL
7. FEELING AFRAID AS IF SOMETHING AWFUL MIGHT HAPPEN: NOT AT ALL
GAD7 TOTAL SCORE: 0
8. IF YOU CHECKED OFF ANY PROBLEMS, HOW DIFFICULT HAVE THESE MADE IT FOR YOU TO DO YOUR WORK, TAKE CARE OF THINGS AT HOME, OR GET ALONG WITH OTHER PEOPLE?: NOT DIFFICULT AT ALL
GAD7 TOTAL SCORE: 0
5. BEING SO RESTLESS THAT IT IS HARD TO SIT STILL: NOT AT ALL
GAD7 TOTAL SCORE: 0
3. WORRYING TOO MUCH ABOUT DIFFERENT THINGS: NOT AT ALL
1. FEELING NERVOUS, ANXIOUS, OR ON EDGE: NOT AT ALL
IF YOU CHECKED OFF ANY PROBLEMS ON THIS QUESTIONNAIRE, HOW DIFFICULT HAVE THESE PROBLEMS MADE IT FOR YOU TO DO YOUR WORK, TAKE CARE OF THINGS AT HOME, OR GET ALONG WITH OTHER PEOPLE: NOT DIFFICULT AT ALL
6. BECOMING EASILY ANNOYED OR IRRITABLE: NOT AT ALL

## 2024-11-21 ASSESSMENT — PATIENT HEALTH QUESTIONNAIRE - PHQ9
10. IF YOU CHECKED OFF ANY PROBLEMS, HOW DIFFICULT HAVE THESE PROBLEMS MADE IT FOR YOU TO DO YOUR WORK, TAKE CARE OF THINGS AT HOME, OR GET ALONG WITH OTHER PEOPLE: NOT DIFFICULT AT ALL
SUM OF ALL RESPONSES TO PHQ QUESTIONS 1-9: 0
SUM OF ALL RESPONSES TO PHQ QUESTIONS 1-9: 0

## 2024-11-21 ASSESSMENT — PAIN SCALES - GENERAL: PAINLEVEL_OUTOF10: NO PAIN (0)

## 2024-11-21 NOTE — PROGRESS NOTES
"Nurse Note ( Pre-Travel Consult)    Itinerary:  [facility]     Departure Date: 12/20/2024    Return Date: 1/3/2025    Length of Trip 2 weeks     Reason for Travel: Tourism         Urban or rural: both    Accommodations: Hotel        IMMUNIZATION HISTORY  Have you received any immunizations within the past 4 weeks?  No  Have you ever fainted from having your blood drawn or from an injection?  No  Have you ever had a fever reaction to vaccination?  No  Have you ever had any bad reaction or side effect from any vaccination?  No  Have you ever had hepatitis A or B vaccine?  Yes  Do you live (or work closely) with anyone who has AIDS, an AIDS-like condition, any other immune disorder or who is on chemotherapy for cancer?  No  Do you have a family history of immunodeficiency?  No  Have you received any injection of immune globulin or any blood products during the past 12 months?  No    Patient roomed by [place] Burris is a 59 year old female seen today with family member for counsultation for international travel.   Patient will be departing in  1 month(s) and  traveling with family member(s).      Patient itinerary :  will be in the urban region of [facility] x 2 days > [facility] for 4 days  > [facility] [place]  which risk for Dengue Fever, food borne illnesses, motor vehicle accidents, and Typhoid. exposure.      Patient's activities will include sightseeing, hiking, and beach activities (salt water).    Patient's country of birth is [place]    Special medical concerns: none  Pre-travel questionnaire was completed by patient and reviewed by provider.     Vitals: BP (!) 148/90 (BP Location: Left arm, Patient Position: Sitting, Cuff Size: Adult Regular)   Pulse 65   Temp 97.7  F (36.5  C) (Temporal)   Resp 16   Ht 1.7 m (5' 6.93\")   Wt 71 kg (156 lb 9.6 oz)   LMP 10/06/2015 (Approximate)   SpO2 100%   BMI 24.58 kg/m    BMI= Body mass index is 24.58 kg/m .  Did not take lisin day "   EXAM:  General:  Well-nourished, well-developed in no acute distress.  Appears to be stated age, interacts appropriately and expresses understanding of information given to patient.    Current Outpatient Medications   Medication Sig Dispense Refill    lisinopril (ZESTRIL) 10 MG tablet Take 1 tablet (10 mg) by mouth daily. 90 tablet 0    multivitamin w/minerals (THERA-VIT-M) tablet Take 1 tablet by mouth daily      simvastatin (ZOCOR) 20 MG tablet Take 1 tablet (20 mg) by mouth at bedtime. 90 tablet 1    valACYclovir (VALTREX) 500 MG tablet Take 1 tablet (500 mg) by mouth 2 times daily 14 tablet 6    Vitamin D, Cholecalciferol, 10 MCG (400 UNIT) TABS        Patient Active Problem List   Diagnosis    Vitamin D deficiency    Benign essential hypertension    ASCUS of cervix with negative high risk HPV     Allergies   Allergen Reactions    Penicillins Rash         Immunizations discussed include:   Covid 19: Ordered/given today, risks, benefits and side effects reviewed  Hepatitis A:  Ordered/given today, risks, benefits and side effects reviewed  Hepatitis B: Ordered/given today, risks, benefits and side effects reviewed  Influenza: Up to date  Typhoid: Ordered/given today, risks, benefits and side effects reviewed  Rabies: Declined  reviewed managment of a animal bite or scratch (washing wound, seek medical care within 24 hours for post exposure prophylaxis )  Yellow Fever: Not indicated  Japanese Encephalitis: Not indicated - risk of disease is minimal low risk travel  Meningococcus: Not indicated  Tetanus/Diphtheria: Up to date  Measles/Mumps/Rubella: Titers drawn  Cholera: Not needed  Polio: Not indicated  Pneumococcal: Under age of 65  Varicella: Immune by disease history per patient report  Shingrix: deferred  HPV:  Not indicated     TB: low risk     Altitude Exposure on this trip: no  Past tolerance to Altitude: na    ASSESSMENT/PLAN:  Claudette was seen today for travel clinic.    Diagnoses and all orders for  this visit:    Travel advice encounter  -     azithromycin (ZITHROMAX) 500 MG tablet; Take 1 tablet (500 mg) by mouth daily for 3 doses. Take 1 tablet a day for up to 3 days for severe diarrhea    Immunity status testing  -     Rubeola Antibody IgG; Future  -     Mumps Immune Status, IgG; Future  -     Rubella Antibody IgG; Future  -     Rubeola Antibody IgG  -     Mumps Immune Status, IgG  -     Rubella Antibody IgG    Other orders  -     HEPATITIS B, ADULT 20+ (ENGERIX-B/RECOMBIVAX HB)  -     COVID-19 12+ (PFIZER)  -     TYPHOID VACCINE, IM  -     HEPATITIS A 19+ (HAVRIX/VAQTA)  -     HEPATITIS B, ADULT 20+ (ENGERIX-B/RECOMBIVAX HB); Standing      I have reviewed general recommendations for safe travel   including: food/water precautions, insect precautions, safer sex   practices given high prevalence of Zika, HIV and other STDs,   roadway safety. Educational materials and Travax report provided.    Malaraia prophylaxis recommended: none  Symptomatic treatment for traveler's diarrhea: azithromycin        Evacuation insurance advised and resources were provided to patient.    Total visit time 20 minutes  with over 50% of time spent counseling patient and shared decision making as detailed above.    Julienne Brooks CNP  Certificate in Travel Health                             Answers submitted by the patient for this visit:  Patient Health Questionnaire (Submitted on 11/21/2024)  If you checked off any problems, how difficult have these problems made it for you to do your work, take care of things at home, or get along with other people?: Not difficult at all  PHQ9 TOTAL SCORE: 0  Patient Health Questionnaire (G7) (Submitted on 11/21/2024)  DAYAN 7 TOTAL SCORE: 0

## 2024-11-21 NOTE — PATIENT INSTRUCTIONS
Thank you for visiting the [facility] : [phone]  Today November 21, 2024 you received the    Hepatitis A Vaccine - Please return on 5/20/25 or later for your 2nd and final dose.    Hepatitis B Vaccine -    Typhoid - injectable. This vaccine is valid for two years.     Covid 19     Follow up vaccine appointments can be made as a NURSE ONLY visit at the Travel Clinic, (BE PREPARED TO WAIT, ) or at designated [facility].    If you are receiving the Rabies vaccines series, it is important that you follow the exact schedule ordered.     Pre-travel     We recommend that you purchase Medical Evacuation Insurance prior to your departure.  Https://wwwnc.cdc.gov/travel/page/insurance    [place] your travel plans with the [place] Department of State through STEP ( Smart Traveler Enrollment Program ) https://step.state.gov.  STEP is a free service to allow [place] citizens and nationals traveling and living abroad to enroll their trip with the nearest [place] Embassy or Consulate.    Animal Exposure: Avoid all mammals even if they look healthy.  If there is a bite, scratch or even a lick, wash area immediately with soap and water for 15 minutes and seek medical care within 24 hours for evaluation of Rabies post exposure treatment.  Contact your Medical Evacuation Insurance.    Repiratory illness prevention strategies ( Covid and Influenza ) CDC recommendations:  Consider wearing a mask in crowded or poorly ventilated indoor areas, including on public transportation and in transportation hubs.  Hand washing: frequent, thorough handwashing with soap and water for 20 seconds. Use an alcohol-based hand  with at least 60% alcohol if soap and water are not readily available. Avoid touching face, nose, eyes, mouth unless you have done appropriate hand washing as above.  VACCINES: Staying up to date on COVID-19 vaccines significantly lowers the risk of getting very sick, being  hospitalized, or dying from COVID-19. CDC recommends that everyone stay up to date on their COVID-19 vaccines, especially people with weakened immune systems.    Travel Covid 19 Testing:  updated 12/06/2021  International travelers: Pre-travel:  See country specific Embassy websites or airline websites.    Post travel: CDC recommends getting tested 3-5 days after your trip     Post-travel illness:  Contact your provider or [facility] if you develop a fever, rash, cough, diarrhea or other symptoms for up to 1 year after travel.  Inform your healthcare provider when and where you traveled to.    Please call the [email or web address] with any questions [phone]  Or send your provider a 'My Chart' note.

## 2024-11-25 LAB
MEV IGG SER IA-ACNC: >300 AU/ML
MEV IGG SER IA-ACNC: POSITIVE
MUMPS ANTIBODY IGG INSTRUMENT VALUE: 99.1 AU/ML
MUV IGG SER QL IA: POSITIVE
RUBV IGG SERPL QL IA: 3.86 INDEX
RUBV IGG SERPL QL IA: POSITIVE

## 2024-11-25 NOTE — RESULT ENCOUNTER NOTE
SAMAN Wilkins    Your blood test confirms that you are protected against Measles, Mumps and Rubella.  You do not need the MMR vaccine.  I updated your records    Julienne Brooks (Lori) CNP  [facility]  Certificate in Travel Health

## 2024-12-10 ENCOUNTER — HOSPITAL ENCOUNTER (EMERGENCY)
Facility: CLINIC | Age: 59
Discharge: HOME | End: 2024-12-10
Attending: PHYSICIAN ASSISTANT | Admitting: PHYSICIAN ASSISTANT
Payer: COMMERCIAL

## 2024-12-10 ENCOUNTER — APPOINTMENT (OUTPATIENT)
Dept: GENERAL RADIOLOGY | Facility: CLINIC | Age: 59
End: 2024-12-10
Attending: PHYSICIAN ASSISTANT
Payer: COMMERCIAL

## 2024-12-10 VITALS
BODY MASS INDEX: 24.48 KG/M2 | OXYGEN SATURATION: 100 % | WEIGHT: 156 LBS | HEART RATE: 63 BPM | TEMPERATURE: 98 F | SYSTOLIC BLOOD PRESSURE: 134 MMHG | RESPIRATION RATE: 16 BRPM | DIASTOLIC BLOOD PRESSURE: 86 MMHG

## 2024-12-10 DIAGNOSIS — R07.89 ATYPICAL CHEST PAIN: ICD-10-CM

## 2024-12-10 LAB
ANION GAP SERPL CALCULATED.3IONS-SCNC: 12 MMOL/L (ref 7–15)
ATRIAL RATE - MUSE: 69 BPM
BASOPHILS # BLD AUTO: 0 10E3/UL (ref 0–0.2)
BASOPHILS NFR BLD AUTO: 1 %
BUN SERPL-MCNC: 13.2 MG/DL (ref 8–23)
CALCIUM SERPL-MCNC: 9.8 MG/DL (ref 8.8–10.4)
CHLORIDE SERPL-SCNC: 102 MMOL/L (ref 98–107)
CREAT SERPL-MCNC: 0.85 MG/DL (ref 0.51–0.95)
DIASTOLIC BLOOD PRESSURE - MUSE: NORMAL MMHG
EGFRCR SERPLBLD CKD-EPI 2021: 78 ML/MIN/1.73M2
EOSINOPHIL # BLD AUTO: 0.3 10E3/UL (ref 0–0.7)
EOSINOPHIL NFR BLD AUTO: 4 %
ERYTHROCYTE [DISTWIDTH] IN BLOOD BY AUTOMATED COUNT: 12.5 % (ref 10–15)
GLUCOSE SERPL-MCNC: 95 MG/DL (ref 70–99)
HCO3 SERPL-SCNC: 25 MMOL/L (ref 22–29)
HCT VFR BLD AUTO: 38.2 % (ref 35–47)
HGB BLD-MCNC: 13 G/DL (ref 11.7–15.7)
IMM GRANULOCYTES # BLD: 0 10E3/UL
IMM GRANULOCYTES NFR BLD: 0 %
INTERPRETATION ECG - MUSE: NORMAL
LYMPHOCYTES # BLD AUTO: 2 10E3/UL (ref 0.8–5.3)
LYMPHOCYTES NFR BLD AUTO: 27 %
MCH RBC QN AUTO: 29.5 PG (ref 26.5–33)
MCHC RBC AUTO-ENTMCNC: 34 G/DL (ref 31.5–36.5)
MCV RBC AUTO: 87 FL (ref 78–100)
MONOCYTES # BLD AUTO: 0.6 10E3/UL (ref 0–1.3)
MONOCYTES NFR BLD AUTO: 8 %
NEUTROPHILS # BLD AUTO: 4.5 10E3/UL (ref 1.6–8.3)
NEUTROPHILS NFR BLD AUTO: 61 %
NRBC # BLD AUTO: 0 10E3/UL
NRBC BLD AUTO-RTO: 0 /100
P AXIS - MUSE: 60 DEGREES
PLATELET # BLD AUTO: 206 10E3/UL (ref 150–450)
POTASSIUM SERPL-SCNC: 4 MMOL/L (ref 3.4–5.3)
PR INTERVAL - MUSE: 166 MS
QRS DURATION - MUSE: 80 MS
QT - MUSE: 398 MS
QTC - MUSE: 426 MS
R AXIS - MUSE: 2 DEGREES
RBC # BLD AUTO: 4.4 10E6/UL (ref 3.8–5.2)
SODIUM SERPL-SCNC: 139 MMOL/L (ref 135–145)
SYSTOLIC BLOOD PRESSURE - MUSE: NORMAL MMHG
T AXIS - MUSE: 39 DEGREES
TROPONIN T SERPL HS-MCNC: <6 NG/L
VENTRICULAR RATE- MUSE: 69 BPM
WBC # BLD AUTO: 7.4 10E3/UL (ref 4–11)

## 2024-12-10 PROCEDURE — 80048 BASIC METABOLIC PNL TOTAL CA: CPT | Performed by: PHYSICIAN ASSISTANT

## 2024-12-10 PROCEDURE — 96360 HYDRATION IV INFUSION INIT: CPT | Performed by: PHYSICIAN ASSISTANT

## 2024-12-10 PROCEDURE — 71046 X-RAY EXAM CHEST 2 VIEWS: CPT

## 2024-12-10 PROCEDURE — 84484 ASSAY OF TROPONIN QUANT: CPT | Performed by: PHYSICIAN ASSISTANT

## 2024-12-10 PROCEDURE — 85004 AUTOMATED DIFF WBC COUNT: CPT | Performed by: PHYSICIAN ASSISTANT

## 2024-12-10 PROCEDURE — 93010 ELECTROCARDIOGRAM REPORT: CPT | Performed by: PHYSICIAN ASSISTANT

## 2024-12-10 PROCEDURE — 99285 EMERGENCY DEPT VISIT HI MDM: CPT | Mod: 25 | Performed by: PHYSICIAN ASSISTANT

## 2024-12-10 PROCEDURE — 93005 ELECTROCARDIOGRAM TRACING: CPT | Performed by: PHYSICIAN ASSISTANT

## 2024-12-10 PROCEDURE — 85041 AUTOMATED RBC COUNT: CPT | Performed by: PHYSICIAN ASSISTANT

## 2024-12-10 PROCEDURE — 99284 EMERGENCY DEPT VISIT MOD MDM: CPT | Performed by: PHYSICIAN ASSISTANT

## 2024-12-10 PROCEDURE — 258N000003 HC RX IP 258 OP 636: Performed by: PHYSICIAN ASSISTANT

## 2024-12-10 PROCEDURE — 36415 COLL VENOUS BLD VENIPUNCTURE: CPT | Performed by: PHYSICIAN ASSISTANT

## 2024-12-10 RX ADMIN — SODIUM CHLORIDE 1000 ML: 9 INJECTION, SOLUTION INTRAVENOUS at 19:12

## 2024-12-10 ASSESSMENT — COLUMBIA-SUICIDE SEVERITY RATING SCALE - C-SSRS
6. HAVE YOU EVER DONE ANYTHING, STARTED TO DO ANYTHING, OR PREPARED TO DO ANYTHING TO END YOUR LIFE?: NO
2. HAVE YOU ACTUALLY HAD ANY THOUGHTS OF KILLING YOURSELF IN THE PAST MONTH?: NO
1. IN THE PAST MONTH, HAVE YOU WISHED YOU WERE DEAD OR WISHED YOU COULD GO TO SLEEP AND NOT WAKE UP?: NO

## 2024-12-10 ASSESSMENT — ACTIVITIES OF DAILY LIVING (ADL)
ADLS_ACUITY_SCORE: 41

## 2024-12-11 DIAGNOSIS — I10 BENIGN ESSENTIAL HYPERTENSION: ICD-10-CM

## 2024-12-11 RX ORDER — LISINOPRIL 10 MG/1
TABLET ORAL
Qty: 90 TABLET | Refills: 2 | Status: SHIPPED | OUTPATIENT
Start: 2024-12-11

## 2024-12-11 NOTE — ED TRIAGE NOTES
Pt here with pain to left shoulder blade in to chest.  Had for little while a week ago, then went away and came on tonight around 4pm         Triage Assessment (Adult)       Row Name 12/10/24 1847          Triage Assessment    Airway WDL WDL        Respiratory WDL    Respiratory WDL WDL

## 2024-12-11 NOTE — DISCHARGE INSTRUCTIONS
Your workup today did not show a clear cause for your pain but I do not think it is acutely coming from your heart based on testing today.  I would like you to follow-up with your clinic provider regarding this visit.  In the meantime try to take it easy for the next few days.  If you develop new or worsening symptoms please return to the emergency department.  All questions answered and patient discharged home in stable condition.    Thank you for choosing [facility]'s Emergency Department. It was a pleasure taking care of you today. If you have any questions, please call [phone].    Brenda Valerio, PANathanC

## 2024-12-11 NOTE — ED PROVIDER NOTES
History     Chief Complaint   Patient presents with    Chest Pain       HPI  Claudette Burris is a 59 year old female with a history of hypertension who presents to the emergency department complaining of chest pain. The patient reports around 4 PM she started having some pressure behind her left shoulder blade.  She also had some lightheadedness.  She notes some central lower chest discomfort as well.  She has not had any shortness of breath or pain with deep breathing with this episode but during her normal walking the last few days she has gotten a little short of breath which is unusual.  She had similar pain in her left shoulder blade 4 days ago but no lightheadedness with it so she did not come in for it.  She has had no recent fevers or illnesses.  No increased stress lately.  She took 2 baby aspirin prior to arrival.        Allergies:  Allergies   Allergen Reactions    Penicillins Rash       Problem List:    Patient Active Problem List    Diagnosis Date Noted    ASCUS of cervix with negative high risk HPV 04/19/2021     Priority: Medium     5/22/15 ASCUS pap, neg HR HPV. Plan: cotest in 3 years.   4/19/21 ASCUS pap, neg HR HPV. Plan: Cotest in 1 yr  5/11/22 NIL Pap, Neg HR HPV. Plan: Cotest in 1 yr.   6/19/23 NIL Pap, Neg HR HPV. Plan: cotest in 5 years.       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       Past Surgical History:    Past Surgical History:   Procedure Laterality Date    COLONOSCOPY N/A 10/27/2015    Procedure: COMBINED COLONOSCOPY, SINGLE OR MULTIPLE BIOPSY/POLYPECTOMY BY BIOPSY;  Surgeon: Rohan Tirado MD;  Location: [facility]    HC EXC BENIGN SKIN LESION TRUNK/ARM/LEG >4 CM  03/12/10    excision of left buttock scar     LAPAROSCOPIC CHOLECYSTECTOMY  2010    LAPAROSCOPIC CHOLECYSTECTOMY performed by EDER HURT at  OR    [facility] PART REMV BLADDER,REIMPLNT URETER  age 6       Family History:    Family History   Problem Relation Age of Onset    Hypertension Mother     Coronary Artery Disease Brother         Cabg X 4 age 50    Heart Disease Maternal Grandmother         MI ,  at age 86    Osteoporosis Maternal Grandmother     Musculoskeletal Disorder Maternal Grandmother     Arthritis Maternal Grandmother         RA    Cerebrovascular Disease Maternal Grandfather     Cancer Paternal Grandfather         Luekemia    Family History Negative Other        Social History:  Marital Status:   [2]  Social History     Tobacco Use    Smoking status: Never     Passive exposure: Never    Smokeless tobacco: Never   Vaping Use    Vaping status: Never Used   Substance Use Topics    Alcohol use: No     Comment: rare occ.    Drug use: No        Medications:    lisinopril (ZESTRIL) 10 MG tablet  multivitamin w/minerals (THERA-VIT-M) tablet  simvastatin (ZOCOR) 20 MG tablet  valACYclovir (VALTREX) 500 MG tablet  Vitamin D, Cholecalciferol, 10 MCG (400 UNIT) TABS          Review of Systems   All other systems reviewed and are negative.          Physical Exam   BP: (!) 163/79  Pulse: 76  Temp: 98  F (36.7  C)  Resp: 16  Weight: 70.8 kg (156 lb)  SpO2: 100 %      Physical Exam  Vitals and nursing note reviewed.   Constitutional:       General: She is not in acute distress.     Appearance: She is well-developed. She is not ill-appearing, toxic-appearing or diaphoretic.   HENT:      Head: Normocephalic and atraumatic.      Nose: Nose normal.      Mouth/Throat:      Mouth: Mucous membranes are moist.   Eyes:      Extraocular Movements: Extraocular movements intact.      Conjunctiva/sclera: Conjunctivae normal.      Pupils: Pupils are equal, round, and reactive to light.   Cardiovascular:      Rate and Rhythm: Normal rate and regular  rhythm.      Heart sounds: Normal heart sounds.   Pulmonary:      Effort: Pulmonary effort is normal. No respiratory distress.      Breath sounds: Normal breath sounds.   Chest:      Chest wall: No tenderness.   Abdominal:      General: Abdomen is flat. There is no distension.      Tenderness: There is no abdominal tenderness. There is no right CVA tenderness or left CVA tenderness.   Musculoskeletal:      Cervical back: Neck supple.      Comments: No tenderness to the thoracic back or around left shoulder blade.   Skin:     General: Skin is warm and dry.   Neurological:      General: No focal deficit present.      Mental Status: She is alert and oriented to person, place, and time. Mental status is at baseline.   Psychiatric:         Mood and Affect: Mood normal.             ED Course        Procedures              EKG Interpretation:      Interpreted by Jenna Mendoza PA-C  Time reviewed: 1855  Symptoms at time of EKG: left shoulder pain   Rhythm: normal sinus   Rate: Normal  Axis: Normal  Ectopy: occasional premature ventricular contractions  Conduction: normal  ST Segments/ T Waves: No acute ischemic changes  Q Waves: none  Comparison to prior: Unchanged    Clinical Impression: no acute changes      Results for orders placed or performed during the hospital encounter of 12/10/24 (from the past 24 hours)   EKG 12-lead, tracing only   Result Value Ref Range    Systolic Blood Pressure  mmHg    Diastolic Blood Pressure  mmHg    Ventricular Rate 69 BPM    Atrial Rate 69 BPM    NJ Interval 166 ms    QRS Duration 80 ms     ms    QTc 426 ms    P Axis 60 degrees    R AXIS 2 degrees    T Axis 39 degrees    Interpretation ECG       Sinus rhythm with occasional Premature ventricular complexes  Otherwise normal ECG  No previous ECGs available  Confirmed by SEE ED PROVIDER NOTE FOR, ECG INTERPRETATION ([id number]),  Vidhya Sheffield ([id number]) on 12/10/2024 9:00:54 PM     CBC with Platelets & Differential    Narrative     The following orders were created for panel order CBC with Platelets & Differential.  Procedure                               Abnormality         Status                     ---------                               -----------         ------                     CBC with platelets and d...[[id number]]                      Final result                 Please view results for these tests on the individual orders.   Basic metabolic panel   Result Value Ref Range    Sodium 139 135 - 145 mmol/L    Potassium 4.0 3.4 - 5.3 mmol/L    Chloride 102 98 - 107 mmol/L    Carbon Dioxide (CO2) 25 22 - 29 mmol/L    Anion Gap 12 7 - 15 mmol/L    Urea Nitrogen 13.2 8.0 - 23.0 mg/dL    Creatinine 0.85 0.51 - 0.95 mg/dL    GFR Estimate 78 >60 mL/min/1.73m2    Calcium 9.8 8.8 - 10.4 mg/dL    Glucose 95 70 - 99 mg/dL   Troponin T, High Sensitivity   Result Value Ref Range    Troponin T, High Sensitivity <6 <=14 ng/L   CBC with platelets and differential   Result Value Ref Range    WBC Count 7.4 4.0 - 11.0 10e3/uL    RBC Count 4.40 3.80 - 5.20 10e6/uL    Hemoglobin 13.0 11.7 - 15.7 g/dL    Hematocrit 38.2 35.0 - 47.0 %    MCV 87 78 - 100 fL    MCH 29.5 26.5 - 33.0 pg    MCHC 34.0 31.5 - 36.5 g/dL    RDW 12.5 10.0 - 15.0 %    Platelet Count 206 150 - 450 10e3/uL    % Neutrophils 61 %    % Lymphocytes 27 %    % Monocytes 8 %    % Eosinophils 4 %    % Basophils 1 %    % Immature Granulocytes 0 %    NRBCs per 100 WBC 0 <1 /100    Absolute Neutrophils 4.5 1.6 - 8.3 10e3/uL    Absolute Lymphocytes 2.0 0.8 - 5.3 10e3/uL    Absolute Monocytes 0.6 0.0 - 1.3 10e3/uL    Absolute Eosinophils 0.3 0.0 - 0.7 10e3/uL    Absolute Basophils 0.0 0.0 - 0.2 10e3/uL    Absolute Immature Granulocytes 0.0 <=0.4 10e3/uL    Absolute NRBCs 0.0 10e3/uL   XR Chest 2 Views    Narrative    EXAM: XR CHEST 2 VIEWS  LOCATION: [facility]  DATE: 12/10/2024    INDICATION: left shoulder pain, central chest discomfort  COMPARISON: Chest CT performed  January 22, 2021      Impression    IMPRESSION: Negative chest.       Medications   sodium chloride 0.9% BOLUS 1,000 mL (0 mLs Intravenous Stopped 12/10/24 2023)         Assessments & Plan (with Medical Decision Making)  Claudette Burris is a 59 year old female who presented to the ED complaining of left shoulder pain with lightheadedness.  This started around 4 PM today.  She has had intermittent shoulder pain over the last few days but no lightheadedness with this so when it occurred today she decided to come in for evaluation.  Vague lower central chest discomfort as well but no shortness of breath, nausea or vomiting, or diaphoresis.  She does have a history of hypertension and recent diagnosis of hyperlipidemia for which she is on a statin.  On arrival to the ED she was hypertensive but otherwise had normal vital signs.  Did not appear acutely ill or toxic.  Normal cardiopulmonary exam today.  She also had no tenderness within her thoracic back or abdomen and areas of noted pain today.  Given her lightheadedness she was given IV fluids here.  She already took aspirin prior to arrival so no aspirin given.  EKG obtained which showed a normal sinus rhythm and no ischemic changes.  She did have occasional PVCs but this was seen on previous EKG.  Patient denied any symptoms of palpitations so I think this is a chronic issue for her.  Her troponin after 4 hours of symptoms was negative, ruling ACS unlikely.  Her CBC and BMP were also unremarkable.  I did get a chest x-ray to ensure no mediastinal widening or acute pulmonary pathology causing symptoms and this was reassuringly negative as well.  I went over these results with the patient.  She noted resolution of shoulder discomfort during course of ED stay.  Did have some residual lightheadedness but overall reported feeling improved.  I discussed that clinically I do not think she is having an acute MI based on workup today.  Unclear etiology at this time for her  shoulder pain.  Could certainly be musculoskeletal in nature.  She had no abdominal tenderness to suggest radiating pain to the shoulder and with normal white count I do not think intra-abdominal pathology likely either.  Patient was comfortable with plan to discharge home since she is feeling improved.  I advise she follow-up with her clinic provider regarding this ER visit.  May benefit from stress testing as she did mention some dyspnea with exertion lately when going on her walks.  Advised taking it easy until follow-up.  She was provided instructions on when to return to the ED.  All questions answered and patient discharged home in stable condition.     I have reviewed the nursing notes.    I have reviewed the findings, diagnosis, plan and need for follow up with the patient.      Discharge Medication List as of 12/10/2024  8:56 PM          Final diagnoses:   Atypical chest pain     Note: Chart documentation done in part with Dragon Voice Recognition software. Although reviewed after completion, some word and grammatical errors may remain.     12/10/2024   [facility] EMERGENCY DEPT       Jenna Mendoza PA-C  12/10/24 2120

## 2025-06-20 ENCOUNTER — MYC MEDICAL ADVICE (OUTPATIENT)
Dept: FAMILY MEDICINE | Facility: CLINIC | Age: 60
End: 2025-06-20
Payer: COMMERCIAL

## 2025-06-20 NOTE — TELEPHONE ENCOUNTER
Patient is scheduled for 6/23 for physical but is not due until 9/12/2025, mychart sent.    Brittany Harry CMA ([facility])

## 2025-06-22 SDOH — HEALTH STABILITY: PHYSICAL HEALTH: ON AVERAGE, HOW MANY DAYS PER WEEK DO YOU ENGAGE IN MODERATE TO STRENUOUS EXERCISE (LIKE A BRISK WALK)?: 4 DAYS

## 2025-06-22 SDOH — HEALTH STABILITY: PHYSICAL HEALTH: ON AVERAGE, HOW MANY MINUTES DO YOU ENGAGE IN EXERCISE AT THIS LEVEL?: 30 MIN

## 2025-06-22 ASSESSMENT — SOCIAL DETERMINANTS OF HEALTH (SDOH): HOW OFTEN DO YOU GET TOGETHER WITH FRIENDS OR RELATIVES?: ONCE A WEEK

## 2025-06-23 ENCOUNTER — RESULTS FOLLOW-UP (OUTPATIENT)
Dept: FAMILY MEDICINE | Facility: OTHER | Age: 60
End: 2025-06-23

## 2025-06-23 ENCOUNTER — OFFICE VISIT (OUTPATIENT)
Dept: FAMILY MEDICINE | Facility: CLINIC | Age: 60
End: 2025-06-23
Payer: COMMERCIAL

## 2025-06-23 ENCOUNTER — RESULTS FOLLOW-UP (OUTPATIENT)
Dept: FAMILY MEDICINE | Facility: CLINIC | Age: 60
End: 2025-06-23

## 2025-06-23 VITALS
BODY MASS INDEX: 25.75 KG/M2 | HEIGHT: 67 IN | HEART RATE: 64 BPM | SYSTOLIC BLOOD PRESSURE: 124 MMHG | OXYGEN SATURATION: 98 % | WEIGHT: 164.1 LBS | RESPIRATION RATE: 18 BRPM | TEMPERATURE: 98.5 F | DIASTOLIC BLOOD PRESSURE: 74 MMHG

## 2025-06-23 DIAGNOSIS — N81.4 UTERINE PROLAPSE: ICD-10-CM

## 2025-06-23 DIAGNOSIS — Z12.11 COLON CANCER SCREENING: ICD-10-CM

## 2025-06-23 DIAGNOSIS — I10 BENIGN ESSENTIAL HYPERTENSION: ICD-10-CM

## 2025-06-23 DIAGNOSIS — Z00.00 ROUTINE GENERAL MEDICAL EXAMINATION AT A HEALTH CARE FACILITY: Primary | ICD-10-CM

## 2025-06-23 DIAGNOSIS — Z12.31 ENCOUNTER FOR SCREENING MAMMOGRAM FOR BREAST CANCER: ICD-10-CM

## 2025-06-23 DIAGNOSIS — E78.5 HYPERLIPIDEMIA LDL GOAL <100: ICD-10-CM

## 2025-06-23 LAB
ALBUMIN SERPL BCG-MCNC: 4.6 G/DL (ref 3.5–5.2)
ALP SERPL-CCNC: 74 U/L (ref 40–150)
ALT SERPL W P-5'-P-CCNC: 12 U/L (ref 0–50)
ANION GAP SERPL CALCULATED.3IONS-SCNC: 11 MMOL/L (ref 7–15)
AST SERPL W P-5'-P-CCNC: 25 U/L (ref 0–45)
BASOPHILS # BLD AUTO: 0 10E3/UL (ref 0–0.2)
BASOPHILS NFR BLD AUTO: 1 %
BILIRUB SERPL-MCNC: 0.7 MG/DL
BUN SERPL-MCNC: 14.2 MG/DL (ref 8–23)
CALCIUM SERPL-MCNC: 9.7 MG/DL (ref 8.8–10.4)
CHLORIDE SERPL-SCNC: 106 MMOL/L (ref 98–107)
CHOLEST SERPL-MCNC: 215 MG/DL
CREAT SERPL-MCNC: 1.05 MG/DL (ref 0.51–0.95)
EGFRCR SERPLBLD CKD-EPI 2021: 61 ML/MIN/1.73M2
EOSINOPHIL # BLD AUTO: 0.1 10E3/UL (ref 0–0.7)
EOSINOPHIL NFR BLD AUTO: 2 %
ERYTHROCYTE [DISTWIDTH] IN BLOOD BY AUTOMATED COUNT: 12.7 % (ref 10–15)
EST. AVERAGE GLUCOSE BLD GHB EST-MCNC: 103 MG/DL
FASTING STATUS PATIENT QL REPORTED: YES
FASTING STATUS PATIENT QL REPORTED: YES
GLUCOSE SERPL-MCNC: 97 MG/DL (ref 70–99)
HBA1C MFR BLD: 5.2 % (ref 0–5.6)
HCO3 SERPL-SCNC: 25 MMOL/L (ref 22–29)
HCT VFR BLD AUTO: 40.4 % (ref 35–47)
HDLC SERPL-MCNC: 65 MG/DL
HGB BLD-MCNC: 13.4 G/DL (ref 11.7–15.7)
IMM GRANULOCYTES # BLD: 0 10E3/UL
IMM GRANULOCYTES NFR BLD: 0 %
LDLC SERPL CALC-MCNC: 128 MG/DL
LYMPHOCYTES # BLD AUTO: 1.7 10E3/UL (ref 0.8–5.3)
LYMPHOCYTES NFR BLD AUTO: 28 %
MCH RBC QN AUTO: 29.1 PG (ref 26.5–33)
MCHC RBC AUTO-ENTMCNC: 33.2 G/DL (ref 31.5–36.5)
MCV RBC AUTO: 88 FL (ref 78–100)
MONOCYTES # BLD AUTO: 0.5 10E3/UL (ref 0–1.3)
MONOCYTES NFR BLD AUTO: 9 %
NEUTROPHILS # BLD AUTO: 3.7 10E3/UL (ref 1.6–8.3)
NEUTROPHILS NFR BLD AUTO: 60 %
NONHDLC SERPL-MCNC: 150 MG/DL
PLATELET # BLD AUTO: 206 10E3/UL (ref 150–450)
POTASSIUM SERPL-SCNC: 4 MMOL/L (ref 3.4–5.3)
PROT SERPL-MCNC: 7.3 G/DL (ref 6.4–8.3)
RBC # BLD AUTO: 4.6 10E6/UL (ref 3.8–5.2)
SODIUM SERPL-SCNC: 142 MMOL/L (ref 135–145)
TRIGL SERPL-MCNC: 112 MG/DL
TSH SERPL DL<=0.005 MIU/L-ACNC: 2.08 UIU/ML (ref 0.3–4.2)
WBC # BLD AUTO: 6.1 10E3/UL (ref 4–11)

## 2025-06-23 PROCEDURE — 1126F AMNT PAIN NOTED NONE PRSNT: CPT | Performed by: NURSE PRACTITIONER

## 2025-06-23 PROCEDURE — 99214 OFFICE O/P EST MOD 30 MIN: CPT | Mod: 25 | Performed by: NURSE PRACTITIONER

## 2025-06-23 PROCEDURE — 83036 HEMOGLOBIN GLYCOSYLATED A1C: CPT | Performed by: NURSE PRACTITIONER

## 2025-06-23 PROCEDURE — 3078F DIAST BP <80 MM HG: CPT | Performed by: NURSE PRACTITIONER

## 2025-06-23 PROCEDURE — 36415 COLL VENOUS BLD VENIPUNCTURE: CPT | Performed by: NURSE PRACTITIONER

## 2025-06-23 PROCEDURE — 85025 COMPLETE CBC W/AUTO DIFF WBC: CPT | Performed by: NURSE PRACTITIONER

## 2025-06-23 PROCEDURE — 80053 COMPREHEN METABOLIC PANEL: CPT | Performed by: NURSE PRACTITIONER

## 2025-06-23 PROCEDURE — 80061 LIPID PANEL: CPT | Performed by: NURSE PRACTITIONER

## 2025-06-23 PROCEDURE — G2211 COMPLEX E/M VISIT ADD ON: HCPCS | Performed by: NURSE PRACTITIONER

## 2025-06-23 PROCEDURE — 3074F SYST BP LT 130 MM HG: CPT | Performed by: NURSE PRACTITIONER

## 2025-06-23 PROCEDURE — 84443 ASSAY THYROID STIM HORMONE: CPT | Performed by: NURSE PRACTITIONER

## 2025-06-23 PROCEDURE — 3044F HG A1C LEVEL LT 7.0%: CPT | Performed by: NURSE PRACTITIONER

## 2025-06-23 PROCEDURE — 99396 PREV VISIT EST AGE 40-64: CPT | Performed by: NURSE PRACTITIONER

## 2025-06-23 RX ORDER — SIMVASTATIN 20 MG
20 TABLET ORAL AT BEDTIME
Qty: 90 TABLET | Refills: 3 | Status: SHIPPED | OUTPATIENT
Start: 2025-06-23

## 2025-06-23 RX ORDER — LISINOPRIL 10 MG/1
10 TABLET ORAL DAILY
Qty: 90 TABLET | Refills: 3 | Status: SHIPPED | OUTPATIENT
Start: 2025-06-23

## 2025-06-23 ASSESSMENT — PAIN SCALES - GENERAL: PAINLEVEL_OUTOF10: NO PAIN (0)

## 2025-06-23 NOTE — PROGRESS NOTES
Preventive Care Visit  [facility] ANA Ferro CNP, Family Medicine  Jun 23, 2025      Assessment & Plan     ASSESSMENT/ORDERS:    ICD-10-CM    1. Routine general medical examination at a health care facility  Z00.00 CBC with Platelets & Differential     Lipid panel reflex to direct LDL Fasting     Hemoglobin A1c     TSH with free T4 reflex     CBC with Platelets & Differential     Hemoglobin A1c     TSH with free T4 reflex     CANCELED: Lipid panel reflex to direct LDL Fasting      2. Benign essential hypertension  I10 Comprehensive metabolic panel     lisinopril (ZESTRIL) 10 MG tablet     OFFICE/OUTPT VISIT,EST,LEVL IV     Comprehensive metabolic panel      3. Hyperlipidemia LDL goal <100  E78.5 Comprehensive metabolic panel     simvastatin (ZOCOR) 20 MG tablet     OFFICE/OUTPT VISIT,EST,LEVL IV     Lipid panel reflex to direct LDL Fasting     Comprehensive metabolic panel      4. Colon cancer screening  Z12.11 Colonoscopy Screening  Referral      5. Encounter for screening mammogram for breast cancer  Z12.31 MA Screen Bilateral w/Torres      6. Uterine prolapse  N81.4 Adult Uro/Gyn  Referral          Assessment & Plan    Routine Medical Exam   Immunization recommendations reviewed and ordered per discussion and patient agreement.    Discussed medication coverage for vaccines, and if appropriate to have done at pharmacy.    Discussed options and rationale.  Ordered HCM testing per our discussion and patient decision based on USPSTF and Cancer screening guidelines.      Reinforced healthy habits with lifestyle, exercise and nutrition.     Patient declined vaccines today.    Benign essential hypertension:  - Continue taking lisinopril, labs   BP controlled.     Hyperlipidemia LDL goal <100:  - Continue taking simvastatin daily.- can take in AM for compliance, but best in PM.     Uterine prolapse:  - Likely grade 2 prolapse, possibly moving towards grade 3.  - Referral to  "a urogynecologist for further evaluation and potential fitting for a pessary. Consideration of surgical options if discomfort persists.    Colon cancer screening:  - Colonoscopy ordered.    Encounter for screening mammogram for breast cancer:  - Mammogram ordered.    AI and/or dictation software was used for the creation of this note.    The longitudinal plan of care for the diagnosis(es)/condition(s) as documented were addressed during this visit. Due to the added complexity in care, I will continue to support Claudette in the subsequent management and with ongoing continuity of care.       Patient has been advised of split billing requirements and indicates understanding: Yes        BMI  Estimated body mass index is 25.51 kg/m  as calculated from the following:    Height as of this encounter: 1.708 m (5' 7.25\").    Weight as of this encounter: 74.4 kg (164 lb 1.6 oz).   Weight management plan: Discussed healthy diet and exercise guidelines  Reviewed preventive health counseling, as reflected in patient instructions       Regular exercise       Healthy diet/nutrition       Colorectal Cancer Screening  Counseling  Appropriate preventive services were addressed with this patient via screening, questionnaire, or discussion as appropriate for fall prevention, nutrition, physical activity, Tobacco-use cessation, social engagement, weight loss and cognition.  Checklist reviewing preventive services available has been given to the patient.  Reviewed patient's diet, addressing concerns and/or questions.         Follow-up    Follow-up Visit   Expected date:  Jun 23, 2026 (Approximate)      Follow Up Appointment Details:     Follow-up with whom?: PCP    Follow-Up for what?: Adult Preventive    Any Additional Chronic Condition Management?:  Hypertension  Hyperlipidemia       How?: In Person    Is this an as-needed follow-up?: No                 Subjective   Claudette is a 60 year old, presenting for the " "following:  Physical        6/23/2025     8:44 AM   Additional Questions   Roomed by Annabelle HANSEN   Accompanied by N/A         6/23/2025     8:44 AM   Patient Reported Additional Medications   Patient reports taking the following new medications N/A          HPI  History of Present Illness-  Claudette Burris, 60 years    Pelvic Organ Prolapse  - Noticed vaginal tissue protrusion on Thursday, June 19, 2025, initially thought to be a hemorrhoid  - Describes it as a \"little bubble\" past the vaginal opening  - Feels pressure and pinching on the left side, with aching pain in the left side of the back  - Urination is different, not a steady stream  - Grade 2 prolapse noted in 2023, now likely progressed to grade 3  - Has been performing Kegel exercises regularly, unsure if they have worsened the condition  - No bleeding reported  - [place] is uncomfortable  - Previous consultation with Dr. oBrrego regarding hysterectomy, advised against it at the time  - Interested in maintaining physical activity for the next 30 years    Bowel Movements  - Harder time passing bowel movements  - Recalled a previous colonoscopy indicating an \"L or a Bend\" at the end of the colon    Social History  - Recent trip to [facility] with family during the holidays    Pap Smear  - Last pap smear in 2023, due for next in 2028  - History of one abnormal pap smear     Hyperlipidemia Follow-Up    Are you regularly taking any medication or supplement to lower your cholesterol?   Yes- atorvastatin  Are you having muscle aches or other side effects that you think could be caused by your cholesterol lowering medication?  No    Hypertension Follow-up    Do you check your blood pressure regularly outside of the clinic? No   Are you following a low salt diet? Yes  Are your blood pressures ever more than 140 on the top number (systolic) OR more   than 90 on the bottom number (diastolic), for example 140/90? N/A    BP Readings from Last 2 Encounters: "   06/23/25 124/74   12/10/24 134/86       Advance Care Planning    Health Care Directive received at today's visit.  Forwarded to [employer].        6/22/2025   General Health   How would you rate your overall physical health? Good   Feel stress (tense, anxious, or unable to sleep) Not at all         6/22/2025   Nutrition   Three or more servings of calcium each day? Yes   Diet: Regular (no restrictions)   How many servings of fruit and vegetables per day? (!) 2-3   How many sweetened beverages each day? 0-1         6/22/2025   Exercise   Days per week of moderate/strenous exercise 4 days   Average minutes spent exercising at this level 30 min         6/22/2025   Social Factors   Frequency of gathering with friends or relatives Once a week   Worry food won't last until get money to buy more No   Food not last or not have enough money for food? No   Do you have housing? (Housing is defined as stable permanent housing and does not include staying outside in a car, in a tent, in an abandoned building, in an overnight shelter, or couch-surfing.) Yes   Are you worried about losing your housing? No   Lack of transportation? No   Unable to get utilities (heat,electricity)? No         6/22/2025   Fall Risk   Fallen 2 or more times in the past year? No   Trouble with walking or balance? No          6/22/2025   Dental   Dentist two times every year? Yes         Today's PHQ-2 Score:       6/22/2025     5:23 PM   PHQ-2 ( 1999 [employer])   Q1: Little interest or pleasure in doing things 0   Q2: Feeling down, depressed or hopeless 0   PHQ-2 Score 0    Q1: Little interest or pleasure in doing things Not at all   Q2: Feeling down, depressed or hopeless Not at all   PHQ-2 Score 0       Patient-reported           6/22/2025   Substance Use   Alcohol more than 3/day or more than 7/wk No   Do you use any other substances recreationally? No     Social History     Tobacco Use    Smoking status: Never     Passive exposure: Never     Smokeless tobacco: Never   Vaping Use    Vaping status: Never Used   Substance Use Topics    Alcohol use: No     Comment: rare occ.    Drug use: No           8/12/2024   LAST FHS-7 RESULTS   1st degree relative breast or ovarian cancer No   Any relative bilateral breast cancer No   Any male have breast cancer No   Any ONE woman have BOTH breast AND ovarian cancer No   Any woman with breast cancer before 50yrs No   2 or more relatives with breast AND/OR ovarian cancer No   2 or more relatives with breast AND/OR bowel cancer No        Mammogram Screening - Mammogram every 1-2 years updated in Health Maintenance based on mutual decision making        6/22/2025   STI Screening   New sexual partner(s) since last STI/HIV test? No     History of abnormal Pap smear: No - age 30- 64 PAP with HPV every 5 years recommended        Latest Ref Rng & Units 6/19/2023     1:47 PM 5/11/2022    11:55 AM 4/19/2021    10:49 AM   PAP / HPV   PAP  Negative for Intraepithelial Lesion or Malignancy (NILM)  Negative for Intraepithelial Lesion or Malignancy (NILM)     PAP (Historical)    ASC-US    HPV 16 DNA Negative Negative  Negative     HPV 18 DNA Negative Negative  Negative     Other HR HPV Negative Negative  Negative       ASCVD Risk   The 10-year ASCVD risk score (Myra PRINCE, et al., 2019) is: 4.7%    Values used to calculate the score:      Age: 60 years      Sex: Female      Is Non- : No      Diabetic: No      Tobacco smoker: No      Systolic Blood Pressure: 124 mmHg      Is BP treated: Yes      HDL Cholesterol: 63 mg/dL      Total Cholesterol: 261 mg/dL           Reviewed and updated as needed this visit by Provider                    Lab work is in process      Review of Systems  Constitutional, neuro, ENT, endocrine, pulmonary, cardiac, gastrointestinal, genitourinary, musculoskeletal, integument and psychiatric systems are negative, except as otherwise noted.     Objective    Exam  /74 (Cuff  "Size: Adult Regular)   Pulse 64   Temp 98.5  F (36.9  C) (Temporal)   Resp 18   Ht 1.708 m (5' 7.25\")   Wt 74.4 kg (164 lb 1.6 oz)   LMP 10/06/2015 (Approximate)   SpO2 98%   BMI 25.51 kg/m     Estimated body mass index is 25.51 kg/m  as calculated from the following:    Height as of this encounter: 1.708 m (5' 7.25\").    Weight as of this encounter: 74.4 kg (164 lb 1.6 oz).    Physical Exam  GENERAL: alert and no distress  EYES: Eyes grossly normal to inspection, PERRL and conjunctivae and sclerae normal  HENT: ear canals and TM's normal, nose and mouth without ulcers or lesions  NECK: no adenopathy, no asymmetry, masses, or scars  RESP: lungs clear to auscultation - no rales, rhonchi or wheezes  BREAST: normal without masses, tenderness or nipple discharge and no palpable axillary masses or adenopathy  CV: regular rate and rhythm, normal S1 S2, no S3 or S4, no murmur, click or rub, no peripheral edema  ABDOMEN: soft, nontender, no hepatosplenomegaly, no masses and bowel sounds normal   (female): normal female external genitalia, normal urethral meatus , normal vaginal mucosa, cystocele present, and feels to be GRADE II, but tested in lying position.   MS: no gross musculoskeletal defects noted, no edema  SKIN: no suspicious lesions or rashes  NEURO: Normal strength and tone, mentation intact and speech normal  PSYCH: mentation appears normal, affect normal/bright        Signed Electronically by: ANA Higgins CNP    "

## 2025-06-23 NOTE — PATIENT INSTRUCTIONS
Patient Education   Preventive Care Advice   This is general advice given by our system to help you stay healthy. However, your care team may have specific advice just for you. Please talk to your care team about your preventive care needs.  Nutrition  Eat 5 or more servings of fruits and vegetables each day.  Try wheat bread, brown rice and whole grain pasta (instead of white bread, rice, and pasta).  Get enough calcium and vitamin D. Check the label on foods and aim for 100% of the RDA (recommended daily allowance).  Lifestyle  Exercise at least 150 minutes each week  (30 minutes a day, 5 days a week).  Do muscle strengthening activities 2 days a week. These help control your weight and prevent disease.  No smoking.  Wear sunscreen to prevent skin cancer.  Have a dental exam and cleaning every 6 months.  Yearly exams  See your health care team every year to talk about:  Any changes in your health.  Any medicines your care team has prescribed.  Preventive care, family planning, and ways to prevent chronic diseases.  Shots (vaccines)   HPV shots (up to age 26), if you've never had them before.  Hepatitis B shots (up to age 59), if you've never had them before.  COVID-19 shot: Get this shot when it's due.  Flu shot: Get a flu shot every year.  Tetanus shot: Get a tetanus shot every 10 years.  Pneumococcal, hepatitis A, and RSV shots: Ask your care team if you need these based on your risk.  Shingles shot (for age 50 and up)  General health tests  Diabetes screening:  Starting at age 35, Get screened for diabetes at least every 3 years.  If you are younger than age 35, ask your care team if you should be screened for diabetes.  Cholesterol test: At age 39, start having a cholesterol test every 5 years, or more often if advised.  Bone density scan (DEXA): At age 50, ask your care team if you should have this scan for osteoporosis (brittle bones).  Hepatitis C: Get tested at least once in your life.  STIs (sexually  transmitted infections)  Before age 24: Ask your care team if you should be screened for STIs.  After age 24: Get screened for STIs if you're at risk. You are at risk for STIs (including HIV) if:  You are sexually active with more than one person.  You don't use condoms every time.  You or a partner was diagnosed with a sexually transmitted infection.  If you are at risk for HIV, ask about PrEP medicine to prevent HIV.  Get tested for HIV at least once in your life, whether you are at risk for HIV or not.  Cancer screening tests  Cervical cancer screening: If you have a cervix, begin getting regular cervical cancer screening tests starting at age 21.  Breast cancer scan (mammogram): If you've ever had breasts, begin having regular mammograms starting at age 40. This is a scan to check for breast cancer.  Colon cancer screening: It is important to start screening for colon cancer at age 45.  Have a colonoscopy test every 10 years (or more often if you're at risk) Or, ask your provider about stool tests like a FIT test every year or Cologuard test every 3 years.  To learn more about your testing options, visit:   .  For help making a decision, visit:   https://bit.ly/qf33242.  Prostate cancer screening test: If you have a prostate, ask your care team if a prostate cancer screening test (PSA) at age 55 is right for you.  Lung cancer screening: If you are a current or former smoker ages 50 to 80, ask your care team if ongoing lung cancer screenings are right for you.  For informational purposes only. Not to replace the advice of your health care provider. Copyright   2023 [employer]. All rights reserved. Clinically reviewed by the [facility] Transitions Program. [employer]â„¢ [id number] - REV 01/24.

## 2025-06-23 NOTE — TELEPHONE ENCOUNTER
Ok to keep as is per patient per insurance. Msg updated on appt; closing.    Brittany Harry CMA (AAMA)

## 2025-06-24 ENCOUNTER — PATIENT OUTREACH (OUTPATIENT)
Dept: CARE COORDINATION | Facility: CLINIC | Age: 60
End: 2025-06-24
Payer: COMMERCIAL

## 2025-06-25 ENCOUNTER — TELEPHONE (OUTPATIENT)
Dept: GASTROENTEROLOGY | Facility: CLINIC | Age: 60
End: 2025-06-25
Payer: COMMERCIAL

## 2025-06-25 NOTE — TELEPHONE ENCOUNTER
"Endoscopy Scheduling Screen    Caller: patient    Have you had any respiratory illness or flu-like symptoms in the last 10 days?  No    Patient is ACTIVE on [employer].  Inform patient that all appointment instructions will be sent via [employer].    Review patient's insurance for any non participating payor.    Ordering/Referring Provider:   SHIRLEY RÍOS    (If ordering provider performs procedure, schedule with ordering provider unless otherwise instructed. )    BMI: Estimated body mass index is 25.51 kg/m  as calculated from the following:    Height as of 6/23/25: 1.708 m (5' 7.25\").    Weight as of 6/23/25: 74.4 kg (164 lb 1.6 oz).     Sedation Ordered  moderate sedation.   If patient BMI > 50 do not schedule in [facility].    If patient BMI > 45 do not schedule at [facility].    Are you taking methadone or Suboxone?  NO, No RN review required.    Have you been diagnosed and are being treated for severe PTSD or severe anxiety?  NO, No RN review required.    Are you taking any prescription medications for pain 3 or more times per week?   NO, No RN review required.    Do you have a history of malignant hyperthermia?  No    (Females) Are you currently pregnant?   No     Have you been diagnosed or told you have pulmonary hypertension?   No    Do you have an LVAD?  No    Have you been told you have moderate to severe sleep apnea?  No.    Have you been told you have COPD, asthma, or any other lung disease?  No    Has your doctor ordered any cardiac tests like echo, angiogram, stress test, ablation, or EKG, that you have not completed yet?  No    Do you  have a history of any heart conditions?  No     Have you ever had or are you waiting for an organ transplant?  No. Continue scheduling, no site restrictions.    Have you had a stroke or transient ischemic attack (TIA aka \"mini stroke\") in the last 2 years?   No.    Have you been diagnosed with or been told you have cirrhosis of the liver?   No.    Are you currently on " "dialysis?   No    Do you need assistance transferring?   No    BMI: Estimated body mass index is 25.51 kg/m  as calculated from the following:    Height as of 6/23/25: 1.708 m (5' 7.25\").    Weight as of 6/23/25: 74.4 kg (164 lb 1.6 oz).     Is patients BMI > 40 and scheduling location [facility]?  No    Do you take an injectable or oral medication for weight loss or diabetes (excluding insulin)?  No    Do you take the medication Naltrexone?  No    Do you take blood thinners?  No       Prep   Are you currently on dialysis or do you have chronic kidney disease?  No    Do you have a diagnosis of diabetes?  No    Do you have a diagnosis of cystic fibrosis (CF)?  No    On a regular basis do you go 3 -5 days between bowel movements?  No    BMI > 40?  No    Preferred Pharmacy[place]    [facility] [place] [address] [place] [address]  Phone: [phone] Fax: [phone]      Final Scheduling Details     Procedure scheduled  Colonoscopy    Surgeon:  Francisco     Date of procedure:  7/9     Pre-OP / PAC:   No - Not required for this site.    Location  [facility] - [facility] - Patient preference.    Sedation   Moderate Sedation - Per order.      Patient Reminders:   You will receive a call from a Nurse to review instructions and health history.  This assessment must be completed prior to your procedure.  Failure to complete the Nurse assessment may result in the procedure being cancelled.      On the day of your procedure, please designate an adult(s) who can drive you home stay with you for the next 24 hours. The medicines used in the exam will make you sleepy. You will not be able to drive.      You cannot take public transportation, ride share services, or non-medical taxi service without a responsible caregiver.  Medical transport services are allowed with the requirement that a responsible caregiver will receive you at your destination.  We require that drivers and caregivers are confirmed " prior to your procedure.

## 2025-06-25 NOTE — TELEPHONE ENCOUNTER
Pre visit planning completed.      Procedure details:    Patient scheduled for Colonoscopy on 7/9/25.     Approximate arrival time: 1025. Procedure time 1110.   *Ensure patient is aware that endoscopy team will be calling about 2 days prior to procedure date to confirm arrival time as this may change.     Facility location: [facility]; [address] Floor, [place] [address]. Check in location: 2nd Floor at Surgery desk.  *Disclaimer: Drivers are to check in with patient and stay on campus during procedure.     Sedation type: Conscious sedation     Pre op exam needed? No.    Indication for procedure: screening      Chart review:     Electronic implanted devices? No    Recent diagnosis of diverticulitis within the last 6 weeks? No      Medication review:    Diabetic? No    Anticoagulants? No    Weight loss medication/injectable? No GLP-1 medication per patient's medication list. Nursing to verify with pre-assessment call.    Other medication HOLDING recommendations:  N/A      Prep for procedure:     Bowel prep recommendation: Standard Miralax.   Due to: standard bowel prep    Procedure information and instructions sent via [employer]         Corinne Kliber, RN  Endoscopy Procedure Pre Assessment   [phone] option 3

## 2025-06-25 NOTE — TELEPHONE ENCOUNTER
Attempted to contact patient in order to complete pre assessment questions.     No answer. Left message to return call to [phone] option 3.    Callback communication sent via [facility].    Maty Pollard LPN

## 2025-06-26 ENCOUNTER — PATIENT OUTREACH (OUTPATIENT)
Dept: CARE COORDINATION | Facility: CLINIC | Age: 60
End: 2025-06-26

## 2025-06-26 NOTE — TELEPHONE ENCOUNTER
Pre assessment completed for upcoming procedure.   (Please see previous telephone encounter notes for complete details)    Patient returned call.       Procedure details:    Procedure date 7/9/25, approximate arrival time 1025 and facility location reviewed.   Patient is aware that endoscopy team will be calling about 2 days prior to confirm arrival time.    Designated  policy reviewed and that site requests drivers to check in and stay on campus. Instructed to have someone stay 6  hours post procedure.   *Disclaimer - please notify the [facility] RN GI staff with any  issues/concerns.    Medication review:    Medications reviewed. Please see supporting documentation below. Holding recommendations discussed (if applicable).       Prep for procedure:     Procedure prep instructions reviewed.        Any additional information needed:  N/A      Patient verbalized understanding and had no questions or concerns at this time.      Corinne Kliber, RN  Endoscopy Procedure Pre Assessment   [phone] option 3

## 2025-07-02 ENCOUNTER — NURSE TRIAGE (OUTPATIENT)
Dept: FAMILY MEDICINE | Facility: CLINIC | Age: 60
End: 2025-07-02
Payer: COMMERCIAL

## 2025-07-02 NOTE — CONFIDENTIAL NOTE
Called and spoke with patient. Read providers message. Patient declined in clinic appointment today, due to her working. She declined in clinic appointment tomorrow 7/3 due to travel.   Patient states she is going to try a brat diet and clear liquids. Will call back or seek medical attention if symptoms progress.   Has colonoscopy scheduled for 7/9/2025.           Jenna BOATENG,  RN  [facility]

## 2025-07-02 NOTE — TELEPHONE ENCOUNTER
"Nurse Triage SBAR    Is this a 2nd Level Triage? YES, LICENSED PRACTITIONER REVIEW IS REQUIRED    Situation: s/s of Diverticulosis    Background: Patient states she has s/s of diverticulosis and is requesting medication; [place]     Assessment: Patient reports bloating, LEFT side pain that moves, has increased fluid intake, eating soft foods, back pain/ache left side,   Patient denies diarrhea, constipation, urination, vomiting    Protocol Recommended Disposition:   Home Care   Patient requesting PCP to review    Recommendation:  Please advise    Routed to provider    Gisele Noguera RN on 7/2/2025 at 7:55 AM    Reason for Disposition   Mild abdominal pain     Patient requesting PCP to review    Answer Assessment - Initial Assessment Questions  1. LOCATION: \"Where does it hurt?\"       Left side   2. RADIATION: \"Does the pain shoot anywhere else?\" (e.g., chest, back)      Moves around on the left side  3. ONSET: \"When did the pain begin?\" (e.g., minutes, hours or days ago)       Monday 06/30/2025  4. SUDDEN: \"Gradual or sudden onset?\"      Gradual  5. PATTERN \"Does the pain come and go, or is it constant?\"      Come/goes  6. SEVERITY: \"How bad is the pain?\"  (e.g., Scale 1-10; mild, moderate, or severe)      Feels like an \"ulcer\", moderate  7. RECURRENT SYMPTOM: \"Have you ever had this type of stomach pain before?\" If Yes, ask: \"When was the last time?\" and \"What happened that time?\"       Yes, comes and goes  8. CAUSE: \"What do you think is causing the stomach pain?\"      Diverticulosis   9. RELIEVING/AGGRAVATING FACTORS: \"What makes it better or worse?\" (e.g., antacids, bending or twisting motion, bowel movement)      Increasing fluids, eating less  10. OTHER SYMPTOMS: \"Do you have any other symptoms?\" (e.g., back pain, diarrhea, fever, urination pain, vomiting)        Bloating, ache, back pain - left side  Denies diarrhea, constipation, urination, vomiting,    Protocols " used: Abdominal Pain - Female-A-[place]

## 2025-07-02 NOTE — TELEPHONE ENCOUNTER
Diverticulitis flares are treated with clears/soft food at home. If concerning symptoms will always need visit/exam. She passed a kidney stone within the last 2 weeks. I am not comfortable saying what this is without an exam and likely imaging.   I'm at [facility] today if she'd like to come here.   ANA Higgins CNP

## 2025-07-07 ENCOUNTER — TELEPHONE (OUTPATIENT)
Dept: GASTROENTEROLOGY | Facility: CLINIC | Age: 60
End: 2025-07-07

## 2025-07-07 ENCOUNTER — TELEPHONE (OUTPATIENT)
Dept: UROLOGY | Facility: CLINIC | Age: 60
End: 2025-07-07

## 2025-07-07 ENCOUNTER — VIRTUAL VISIT (OUTPATIENT)
Dept: UROLOGY | Facility: CLINIC | Age: 60
End: 2025-07-07
Payer: COMMERCIAL

## 2025-07-07 DIAGNOSIS — N95.8 GENITOURINARY SYNDROME OF MENOPAUSE: ICD-10-CM

## 2025-07-07 DIAGNOSIS — N81.11 MIDLINE CYSTOCELE: Primary | ICD-10-CM

## 2025-07-07 DIAGNOSIS — N81.6 RECTOCELE: ICD-10-CM

## 2025-07-07 PROCEDURE — 98002 SYNCH AUDIO-VIDEO NEW MOD 45: CPT | Performed by: UROLOGY

## 2025-07-07 PROCEDURE — 1126F AMNT PAIN NOTED NONE PRSNT: CPT | Mod: 95 | Performed by: UROLOGY

## 2025-07-07 RX ORDER — ACETAMINOPHEN 325 MG/1
975 TABLET ORAL ONCE
OUTPATIENT
Start: 2025-07-07 | End: 2025-07-07

## 2025-07-07 RX ORDER — ESTRADIOL 0.1 MG/G
2 CREAM VAGINAL
Qty: 42.5 G | Refills: 11 | Status: SHIPPED | OUTPATIENT
Start: 2025-07-07

## 2025-07-07 NOTE — TELEPHONE ENCOUNTER
Patient scheduled to see Dr. Styles today. Wellprep sent.     Khushi Escudero LPN on 7/7/2025 at 7:41 AM

## 2025-07-07 NOTE — TELEPHONE ENCOUNTER
Check-out Note  -referral to Dr. Khanna to discuss hysterectomy  -schedule combined surgery for robotic SCP  -schedule pessary fitting with nursing.  -vaginal estrogen cream    Above check out note from .     Pended Ob/GYN referral - routing to RN to sign pended referral order.     Gina Beebe MA on 7/7/2025 at 1:51 PM

## 2025-07-07 NOTE — LETTER
Julio Wilkins,            During your visit with Dr. Styles you discussed having a surgery. For this surgery we require you to read and complete the highlighted sections pages 3-6 of this form. Once you have completed this form please mail back to the clinic or drop the consent forms off at the clinic. (If you are receiving this message via [employer] or in the letter tab on [employer] you will be getting a packet in the mail in a couple of days.)  If you have any questions or concerns please call [phone].           Thank you,    Your Urology Care Team

## 2025-07-07 NOTE — TELEPHONE ENCOUNTER
Left voicemail of arrival time of 10:30 AM.     MeMed message sent with updated arrival time.      Vm unavailable, sent [employer]

## 2025-07-07 NOTE — PROGRESS NOTES
Virtual Visit Details    Type of service:  Video Visit     Originating Location (pt. Location): Home    Distant Location (provider location):  Off-site  Platform used for Video Visit: Tres

## 2025-07-07 NOTE — PATIENT INSTRUCTIONS
-referral to Dr. Khanna to discuss hysterectomy  -schedule combined surgery for robotic SCP  -schedule pessary fitting with nursing.  -vaginal estrogen cream

## 2025-07-07 NOTE — TELEPHONE ENCOUNTER
Ob/Gyn referral to Dr. Khanna placed per Dr. Styles. My chart message sent to patient regarding scheduling nurse visit for pessary fitting.     Alexa Honeycutt RN, BSN

## 2025-07-07 NOTE — PROGRESS NOTES
HPI:  Claudette Burris is a 60 year old female with pelvic organ prolapse.  This has been the case for many years but it is getting worse over the last month.  It is bothersome to the patient.  She feels a pressure in her bladder and some discomfort with it.    Reviewed previous notes from Flor Blas from 6/23/25    Exam:  LMP 10/06/2015 (Approximate)   GENERAL: alert and no distress  EYES: Eyes grossly normal to inspection.  No discharge or erythema, or obvious scleral/conjunctival abnormalities.  RESP: No audible wheeze, cough, or visible cyanosis.    SKIN: Visible skin clear. No significant rash, abnormal pigmentation or lesions.  NEURO: Cranial nerves grossly intact.  Mentation and speech appropriate for age.  PSYCH: Appropriate affect, tone, and pace of words      Review of Labs:  The following labs were reviewed by me and discussed with the patient:  Lab Results   Component Value Date    WBC 6.1 06/23/2025    WBC 7.2 08/28/2017     Lab Results   Component Value Date    RBC 4.60 06/23/2025    RBC 4.39 08/28/2017     Lab Results   Component Value Date    HGB 13.4 06/23/2025    HGB 12.6 08/28/2017     Lab Results   Component Value Date    HCT 40.4 06/23/2025    HCT 39.3 08/28/2017     Lab Results   Component Value Date    MCV 88 06/23/2025    MCV 90 08/28/2017     Lab Results   Component Value Date    MCH 29.1 06/23/2025    MCH 28.7 08/28/2017     Lab Results   Component Value Date    MCHC 33.2 06/23/2025    MCHC 32.1 08/28/2017     Lab Results   Component Value Date    RDW 12.7 06/23/2025    RDW 13.4 08/28/2017     Lab Results   Component Value Date     06/23/2025     08/28/2017        Last Comprehensive Metabolic Panel:  Sodium   Date Value Ref Range Status   06/23/2025 142 135 - 145 mmol/L Final   01/14/2021 143 133 - 144 mmol/L Final     Potassium   Date Value Ref Range Status   06/23/2025 4.0 3.4 - 5.3 mmol/L Final   05/11/2022 4.2 3.4 - 5.3 mmol/L Final   01/14/2021 4.0 3.4 - 5.3 mmol/L  Final     Chloride   Date Value Ref Range Status   06/23/2025 106 98 - 107 mmol/L Final   05/11/2022 108 94 - 109 mmol/L Final   01/14/2021 110 (H) 94 - 109 mmol/L Final     Carbon Dioxide   Date Value Ref Range Status   01/14/2021 30 20 - 32 mmol/L Final     Carbon Dioxide (CO2)   Date Value Ref Range Status   06/23/2025 25 22 - 29 mmol/L Final   05/11/2022 29 20 - 32 mmol/L Final     Anion Gap   Date Value Ref Range Status   06/23/2025 11 7 - 15 mmol/L Final   05/11/2022 5 3 - 14 mmol/L Final   01/14/2021 3 3 - 14 mmol/L Final     Glucose   Date Value Ref Range Status   06/23/2025 97 70 - 99 mg/dL Final   05/11/2022 98 70 - 99 mg/dL Final   01/14/2021 96 70 - 99 mg/dL Final     Comment:     Fasting specimen     Urea Nitrogen   Date Value Ref Range Status   06/23/2025 14.2 8.0 - 23.0 mg/dL Final   05/11/2022 9 7 - 30 mg/dL Final   01/14/2021 10 7 - 30 mg/dL Final     Creatinine   Date Value Ref Range Status   06/23/2025 1.05 (H) 0.51 - 0.95 mg/dL Final   01/14/2021 0.75 0.52 - 1.04 mg/dL Final     GFR Estimate   Date Value Ref Range Status   06/23/2025 61 >60 mL/min/1.73m2 Final     Comment:     eGFR calculated using 2021 CKD-EPI equation.   01/14/2021 90 >60 mL/min/[1.73_m2] Final     Comment:     Non  GFR Calc  Starting 12/18/2018, serum creatinine based estimated GFR (eGFR) will be   calculated using the Chronic Kidney Disease Epidemiology Collaboration   (CKD-EPI) equation.       Calcium   Date Value Ref Range Status   06/23/2025 9.7 8.8 - 10.4 mg/dL Final   01/14/2021 9.2 8.5 - 10.1 mg/dL Final     Bilirubin Total   Date Value Ref Range Status   06/23/2025 0.7 <=1.2 mg/dL Final   10/28/2015 0.7 0.2 - 1.3 mg/dL Final     Alkaline Phosphatase   Date Value Ref Range Status   06/23/2025 74 40 - 150 U/L Final   10/28/2015 76 40 - 150 U/L Final     ALT   Date Value Ref Range Status   06/23/2025 12 0 - 50 U/L Final   10/28/2015 16 0 - 50 U/L Final     AST   Date Value Ref Range Status   06/23/2025 25  0 - 45 U/L Final   10/28/2015 12 0 - 45 U/L Final                Color Urine (no units)   Date Value   02/21/2024 Straw   12/31/2018 Yellow     Appearance Urine (no units)   Date Value   02/21/2024 Clear   12/31/2018 Clear     Glucose Urine (mg/dL)   Date Value   02/21/2024 Negative   12/31/2018 Negative     Bilirubin Urine (no units)   Date Value   02/21/2024 Negative   12/31/2018 Negative     Ketones Urine (mg/dL)   Date Value   02/21/2024 20 (A)   12/31/2018 Negative     Specific Gravity Urine (no units)   Date Value   02/21/2024 1.008   12/31/2018 1.019     pH Urine   Date Value   02/21/2024 5.0   12/31/2018 5.0 pH     Protein Albumin Urine (mg/dL)   Date Value   02/21/2024 Negative   12/31/2018 Negative     Urobilinogen Urine (EU/dL)   Date Value   10/16/2015 0.2     Nitrite Urine (no units)   Date Value   02/21/2024 Negative   12/31/2018 Negative     Leukocyte Esterase Urine (no units)   Date Value   02/21/2024 Negative   12/31/2018 Negative          Assessment & Plan   60 y.o female with pelvic organ prolapse.  She does not have any stress incontinence We discussed options of observation, PFPT, pessary, and surgery.  We discussed options of  vaginal primary as well as hysteropexy and option of robotic sacrocolpopexy.  We discussed the use of mesh in surgery and the risks which include but are not limited to infection, bleeding, exposure of mesh, vaginal pain, injury to the bladder/urethra/rectum or any structures in the abdomen or pelvis, as well as risk of incontinence or urinary retention. There is also risk of need for catheterization and possible further surgery.  The pt. Verbalized understanding and had a chance to ask her questions which were all answered.  Of note she had ureteral reimplant  -referral to Dr. Khanna to discuss hysterectomy  -schedule combined surgery for robotic SCP  -schedule pessary fitting with nursing.  -vaginal estrogen cream    Dotty Styles MD  [facility]  [facility]

## 2025-07-07 NOTE — NURSING NOTE
Current patient location: passenger in vehicle    Is the patient currently in the state of [place]? YES    Visit mode: VIDEO    If the visit is dropped, the patient can be reconnected by:VIDEO VISIT: Text to cell phone:   Telephone Information:   Mobile [phone]       Will anyone else be joining the visit? NO  (If patient encounters technical issues they should call [phone]    Are changes needed to the allergy or medication list? No, Pt stated no changes to allergies, and Pt stated no med changes    Are refills needed on medications prescribed by this physician? NO    Rooming Documentation:  Not applicable    Reason for visit: Consult    Jessica ADAM

## 2025-07-07 NOTE — TELEPHONE ENCOUNTER
Mailed out bladder sling consent form to patient's address.     Once received back in clinic we will send to STAT scanning to be placed into patient's chart.     Gina Beebe MA on 7/7/2025 at 1:54 PM

## 2025-07-07 NOTE — TELEPHONE ENCOUNTER
[facility] Call Center    Phone Message    May a detailed message be left on voicemail: yes     Reason for Call: Other: pt calling back for appt for virtual for today, says she will take it, please reach out to her regarding time, phone verified     Action Taken: Other: urology    Travel Screening: Not Applicable     Date of Service:

## 2025-07-08 ENCOUNTER — ANESTHESIA EVENT (OUTPATIENT)
Dept: SURGERY | Facility: AMBULATORY SURGERY CENTER | Age: 60
End: 2025-07-08
Payer: COMMERCIAL

## 2025-07-08 ENCOUNTER — PATIENT OUTREACH (OUTPATIENT)
Dept: CARE COORDINATION | Facility: CLINIC | Age: 60
End: 2025-07-08
Payer: COMMERCIAL

## 2025-07-09 ENCOUNTER — HOSPITAL ENCOUNTER (OUTPATIENT)
Facility: AMBULATORY SURGERY CENTER | Age: 60
Discharge: HOME | End: 2025-07-09
Attending: INTERNAL MEDICINE
Payer: COMMERCIAL

## 2025-07-09 ENCOUNTER — ANESTHESIA (OUTPATIENT)
Dept: SURGERY | Facility: AMBULATORY SURGERY CENTER | Age: 60
End: 2025-07-09
Payer: COMMERCIAL

## 2025-07-09 ENCOUNTER — ALLIED HEALTH/NURSE VISIT (OUTPATIENT)
Dept: NURSING | Facility: CLINIC | Age: 60
End: 2025-07-09
Payer: COMMERCIAL

## 2025-07-09 VITALS
SYSTOLIC BLOOD PRESSURE: 108 MMHG | WEIGHT: 164 LBS | RESPIRATION RATE: 16 BRPM | HEART RATE: 58 BPM | DIASTOLIC BLOOD PRESSURE: 66 MMHG | TEMPERATURE: 96.4 F | BODY MASS INDEX: 25.5 KG/M2 | OXYGEN SATURATION: 96 %

## 2025-07-09 VITALS — HEART RATE: 68 BPM

## 2025-07-09 DIAGNOSIS — N81.11 MIDLINE CYSTOCELE: Primary | ICD-10-CM

## 2025-07-09 LAB — COLONOSCOPY: NORMAL

## 2025-07-09 PROCEDURE — 57160 INSERT PESSARY/OTHER DEVICE: CPT | Performed by: UROLOGY

## 2025-07-09 RX ORDER — PROCHLORPERAZINE MALEATE 10 MG
10 TABLET ORAL EVERY 6 HOURS PRN
Status: DISCONTINUED | OUTPATIENT
Start: 2025-07-09 | End: 2025-07-10 | Stop reason: HOSPADM

## 2025-07-09 RX ORDER — NALOXONE HYDROCHLORIDE 0.4 MG/ML
0.4 INJECTION, SOLUTION INTRAMUSCULAR; INTRAVENOUS; SUBCUTANEOUS
Status: DISCONTINUED | OUTPATIENT
Start: 2025-07-09 | End: 2025-07-10 | Stop reason: HOSPADM

## 2025-07-09 RX ORDER — ONDANSETRON 2 MG/ML
4 INJECTION INTRAMUSCULAR; INTRAVENOUS
Status: DISCONTINUED | OUTPATIENT
Start: 2025-07-09 | End: 2025-07-10 | Stop reason: HOSPADM

## 2025-07-09 RX ORDER — SODIUM CHLORIDE, SODIUM LACTATE, POTASSIUM CHLORIDE, CALCIUM CHLORIDE 600; 310; 30; 20 MG/100ML; MG/100ML; MG/100ML; MG/100ML
INJECTION, SOLUTION INTRAVENOUS CONTINUOUS
Status: DISCONTINUED | OUTPATIENT
Start: 2025-07-09 | End: 2025-07-10 | Stop reason: HOSPADM

## 2025-07-09 RX ORDER — FLUMAZENIL 0.1 MG/ML
0.2 INJECTION, SOLUTION INTRAVENOUS
Status: DISCONTINUED | OUTPATIENT
Start: 2025-07-09 | End: 2025-07-09 | Stop reason: HOSPADM

## 2025-07-09 RX ORDER — LIDOCAINE 40 MG/G
CREAM TOPICAL
Status: DISCONTINUED | OUTPATIENT
Start: 2025-07-09 | End: 2025-07-10 | Stop reason: HOSPADM

## 2025-07-09 RX ORDER — FENTANYL CITRATE 50 UG/ML
50 INJECTION, SOLUTION INTRAMUSCULAR; INTRAVENOUS EVERY 5 MIN PRN
Status: DISCONTINUED | OUTPATIENT
Start: 2025-07-09 | End: 2025-07-10 | Stop reason: HOSPADM

## 2025-07-09 RX ORDER — NALOXONE HYDROCHLORIDE 0.4 MG/ML
0.2 INJECTION, SOLUTION INTRAMUSCULAR; INTRAVENOUS; SUBCUTANEOUS
Status: DISCONTINUED | OUTPATIENT
Start: 2025-07-09 | End: 2025-07-10 | Stop reason: HOSPADM

## 2025-07-09 RX ORDER — ONDANSETRON 2 MG/ML
4 INJECTION INTRAMUSCULAR; INTRAVENOUS EVERY 6 HOURS PRN
Status: DISCONTINUED | OUTPATIENT
Start: 2025-07-09 | End: 2025-07-10 | Stop reason: HOSPADM

## 2025-07-09 RX ORDER — ONDANSETRON 2 MG/ML
4 INJECTION INTRAMUSCULAR; INTRAVENOUS EVERY 30 MIN PRN
Status: DISCONTINUED | OUTPATIENT
Start: 2025-07-09 | End: 2025-07-10 | Stop reason: HOSPADM

## 2025-07-09 RX ORDER — FENTANYL CITRATE 50 UG/ML
25 INJECTION, SOLUTION INTRAMUSCULAR; INTRAVENOUS EVERY 5 MIN PRN
Status: DISCONTINUED | OUTPATIENT
Start: 2025-07-09 | End: 2025-07-10 | Stop reason: HOSPADM

## 2025-07-09 RX ORDER — DEXAMETHASONE SODIUM PHOSPHATE 4 MG/ML
4 INJECTION, SOLUTION INTRA-ARTICULAR; INTRALESIONAL; INTRAMUSCULAR; INTRAVENOUS; SOFT TISSUE
Status: DISCONTINUED | OUTPATIENT
Start: 2025-07-09 | End: 2025-07-10 | Stop reason: HOSPADM

## 2025-07-09 RX ORDER — PROPOFOL 10 MG/ML
INJECTION, EMULSION INTRAVENOUS CONTINUOUS PRN
Status: DISCONTINUED | OUTPATIENT
Start: 2025-07-09 | End: 2025-07-09

## 2025-07-09 RX ORDER — ONDANSETRON 4 MG/1
4 TABLET, ORALLY DISINTEGRATING ORAL EVERY 30 MIN PRN
Status: DISCONTINUED | OUTPATIENT
Start: 2025-07-09 | End: 2025-07-10 | Stop reason: HOSPADM

## 2025-07-09 RX ORDER — ACETAMINOPHEN 325 MG/1
975 TABLET ORAL ONCE
Status: DISCONTINUED | OUTPATIENT
Start: 2025-07-09 | End: 2025-07-10 | Stop reason: HOSPADM

## 2025-07-09 RX ORDER — NALOXONE HYDROCHLORIDE 0.4 MG/ML
0.1 INJECTION, SOLUTION INTRAMUSCULAR; INTRAVENOUS; SUBCUTANEOUS
Status: DISCONTINUED | OUTPATIENT
Start: 2025-07-09 | End: 2025-07-10 | Stop reason: HOSPADM

## 2025-07-09 RX ORDER — ONDANSETRON 4 MG/1
4 TABLET, ORALLY DISINTEGRATING ORAL EVERY 6 HOURS PRN
Status: DISCONTINUED | OUTPATIENT
Start: 2025-07-09 | End: 2025-07-10 | Stop reason: HOSPADM

## 2025-07-09 RX ORDER — LIDOCAINE HYDROCHLORIDE 20 MG/ML
INJECTION, SOLUTION INFILTRATION; PERINEURAL PRN
Status: DISCONTINUED | OUTPATIENT
Start: 2025-07-09 | End: 2025-07-09

## 2025-07-09 RX ORDER — PROPOFOL 10 MG/ML
INJECTION, EMULSION INTRAVENOUS PRN
Status: DISCONTINUED | OUTPATIENT
Start: 2025-07-09 | End: 2025-07-09

## 2025-07-09 RX ADMIN — LIDOCAINE HYDROCHLORIDE 60 MG: 20 INJECTION, SOLUTION INFILTRATION; PERINEURAL at 11:26

## 2025-07-09 RX ADMIN — SODIUM CHLORIDE, SODIUM LACTATE, POTASSIUM CHLORIDE, CALCIUM CHLORIDE: 600; 310; 30; 20 INJECTION, SOLUTION INTRAVENOUS at 11:22

## 2025-07-09 RX ADMIN — PROPOFOL 100 MG: 10 INJECTION, EMULSION INTRAVENOUS at 11:26

## 2025-07-09 RX ADMIN — PROPOFOL 150 MCG/KG/MIN: 10 INJECTION, EMULSION INTRAVENOUS at 11:26

## 2025-07-09 NOTE — ANESTHESIA POSTPROCEDURE EVALUATION
Patient: Claudette Burris    Procedure: Procedure(s):  Colonoscopy, Screening  COLONOSCOPY, FLEXIBLE, WITH LESION REMOVAL USING SNARE       Anesthesia Type:  MAC    Note:  Disposition: Outpatient   Postop Pain Control: Uneventful            Sign Out: Well controlled pain   PONV: No   Neuro/Psych: Uneventful            Sign Out: Acceptable/Baseline neuro status   Airway/Respiratory: Uneventful            Sign Out: Acceptable/Baseline resp. status   CV/Hemodynamics: Uneventful            Sign Out: Acceptable CV status; No obvious hypovolemia; No obvious fluid overload   Other NRE: NONE   DID A NON-ROUTINE EVENT OCCUR? No           Last vitals:  Vitals Value Taken Time   /66 07/09/25 12:15   Temp 96.4  F (35.8  C) 07/09/25 12:15   Pulse 58 07/09/25 12:15   Resp 16 07/09/25 12:15   SpO2 96 % 07/09/25 12:15       Electronically Signed By: Zay Cabrera MD  July 9, 2025  12:31 PM

## 2025-07-09 NOTE — H&P
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       Past surgical history:   Past Surgical History:   Procedure Laterality Date    COLONOSCOPY N/A 10/27/2015    Procedure: COMBINED COLONOSCOPY, SINGLE OR MULTIPLE BIOPSY/POLYPECTOMY BY BIOPSY;  Surgeon: Rohan Tirado MD;  Location:  GI    HC EXC BENIGN SKIN LESION TRUNK/ARM/LEG >4 CM  03/12/10    excision of left buttock scar    LAPAROSCOPIC CHOLECYSTECTOMY  12/8/2010    LAPAROSCOPIC CHOLECYSTECTOMY performed by EDER HURT at  OR    [facility] PART REMV BLADDER,REIMPLNT URETER  age 6       Current Outpatient Medications   Medication Sig Dispense Refill    lisinopril (ZESTRIL) 10 MG tablet Take 1 tablet (10 mg) by mouth daily. 90 tablet 3    multivitamin w/minerals (THERA-VIT-M) tablet Take 1 tablet by mouth daily      simvastatin (ZOCOR) 20 MG tablet Take 1 tablet (20 mg) by mouth at bedtime. 90 tablet 3    estradiol (ESTRACE) 0.1 MG/GM vaginal cream Place 2 g vaginally twice a week. 42.5 g 11    valACYclovir (VALTREX) 500 MG tablet Take 1 tablet (500 mg) by mouth 2 times daily. 14 tablet 0    Vitamin D, Cholecalciferol, 10 MCG (400 UNIT) TABS        Current Facility-Administered Medications   Medication Dose Route Frequency Provider Last Rate Last Admin    acetaminophen (TYLENOL) tablet 975 mg  975 mg Oral Once Franklin Hammer MD        lactated ringers infusion   Intravenous Continuous Franklin Hammer MD        lidocaine (LMX4) kit   Topical Q1H PRN Franklin Hammer MD        lidocaine (LMX4) kit   Topical Q1H PRN Nathen Singh MD        lidocaine 1 % 0.1-1 mL  0.1-1 mL Other Q1H PRN Franklin Hammer MD        lidocaine 1 % 0.1-1  mL  0.1-1 mL Other Q1H PRN Nathen Singh MD        ondansetron (ZOFRAN) injection 4 mg  4 mg Intravenous Once PRN Nathen Singh MD        sodium chloride (PF) 0.9% PF flush 3 mL  3 mL Intracatheter Q8H Franklin Hammer MD        sodium chloride (PF) 0.9% PF flush 3 mL  3 mL Intracatheter q1 min prn Franklin Hammer MD        sodium chloride (PF) 0.9% PF flush 3 mL  3 mL Intracatheter Q8H [facility] Nathen Singh MD        sodium chloride (PF) 0.9% PF flush 3 mL  3 mL Intracatheter q1 min prn Nathen Singh MD           Allergies   Allergen Reactions    Penicillins Rash       History of Anesthesia/Sedation Problems: no    PHYSICAL EXAMINATION:  Constitutional: aaox3, cooperative, pleasant  Vitals reviewed: /79 (Cuff Size: Adult Regular)   Pulse 80   Temp 97.1  F (36.2  C) (Temporal)   Resp 16   Wt 74.4 kg (164 lb)   LMP 10/06/2015 (Approximate)   SpO2 98%   BMI 25.50 kg/m    Wt:   Wt Readings from Last 2 Encounters:   07/09/25 74.4 kg (164 lb)   06/23/25 74.4 kg (164 lb 1.6 oz)      Eyes: Sclera anicteric/injected  Ears/nose/mouth/throat: Normal oropharynx without ulcers or exudate, mucus membranes moist, hearing intact  Neck: supple, thyroid normal size  CV: No edema  Respiratory: Unlabored breathing  Lymph: No submandibular, supraclavicular or inguinal lymphadenopathy  Abd: Nondistended, no masses, nontender  Skin: warm, perfused, no jaundice  Psych: Normal affect  MSK: normal movement on limited exam.    ASA Score: See Provation note    Assessment/Plan:     The patient is an appropriate candidate to receive sedation.    Informed consent was discussed with the patient/family, including the risks, benefits, potential complications and any alternative options associated with sedation.    Patient assessment completed just prior to sedation and while under constant observation by the provider. Condition determined to be adequate for proceeding with  sedation.    The specific risks for the procedure were discussed with the patient at the time of informed consent and include but are not limited to perforation which could require surgery, missing significant neoplasm or lesion, hemorrhage and adverse sedative complication.      Nathen Singh MD

## 2025-07-09 NOTE — ANESTHESIA CARE TRANSFER NOTE
Patient: Claudette Burris    Procedure: Procedure(s):  Colonoscopy, Screening  COLONOSCOPY, FLEXIBLE, WITH LESION REMOVAL USING SNARE       Diagnosis: Colon cancer screening [Z12.11]  Diagnosis Additional Information: No value filed.    Anesthesia Type:   MAC     Note:    Oropharynx: oropharynx clear of all foreign objects  Level of Consciousness: drowsy  Oxygen Supplementation: room air    Independent Airway: airway patency satisfactory and stable  Dentition: dentition unchanged  Vital Signs Stable: post-procedure vital signs reviewed and stable  Report to RN Given: handoff report given  Patient transferred to: Phase II  Comments: To Phase II. Report to RN.  VSS Resp status stable.  Handoff Report: Identifed the Patient, Identified the Reponsible Provider, Reviewed the pertinent medical history, Discussed the surgical course, Reviewed Intra-OP anesthesia mangement and issues during anesthesia, Set expectations for post-procedure period and Allowed opportunity for questions and acknowledgement of understanding      Vitals:  Vitals Value Taken Time   BP     Temp     Pulse 68 07/09/25 11:48   Resp     SpO2         Electronically Signed By: ANA Louise CRNA  July 9, 2025  11:50 AM

## 2025-07-09 NOTE — NURSING NOTE
Claudette Burris comes into clinic today at the request of Dr. Styles Ordering Provider for Pessary Fitting for diagnosis of midline cystocele.     Patient arrived in clinic for pessary fitting. Pessary insertion, removal, and care reviewed with patient. Pessary ring with support size 3 placed and provided adequate fit, however patient reported pelvic pressure. Patient fit with pessary ring with support size #2.    Patient demonstrated insertion and removal independently and was sent home with pessary right with support size #2 in place. Pessary ring with support size #3 also sent home with patient. Informed patient that if the pessary size # 2 doesn't fit well or falls out, then she may place the pessary ring with support size #3.    Patient aware of recommendation to have consult with Dr. Khanna to discuss a combined surgery case with Dr. Styles. Per patient, she hasn't been contacted yet, however has the number for scheduling through her my chart.     This service provided today was under the supervising provider of the day Dr. Marin, who was available if needed.    Alexa Honeycutt RN, BSN

## 2025-07-09 NOTE — ANESTHESIA PREPROCEDURE EVALUATION
Anesthesia Pre-Procedure Evaluation    Patient: Claudette Burris   MRN: [id number] : 1965          Procedure : Procedure(s):  Colonoscopy, Screening         Past Medical History:   Diagnosis Date    ASCUS favoring benign 2015    Hypertension     Other and unspecified uterine inertia, with delivery 1997      Past Surgical History:   Procedure Laterality Date    COLONOSCOPY N/A 10/27/2015    Procedure: COMBINED COLONOSCOPY, SINGLE OR MULTIPLE BIOPSY/POLYPECTOMY BY BIOPSY;  Surgeon: Rohan Tirado MD;  Location:  GI    HC EXC BENIGN SKIN LESION TRUNK/ARM/LEG >4 CM  03/12/10    excision of left buttock scar    LAPAROSCOPIC CHOLECYSTECTOMY  2010    LAPAROSCOPIC CHOLECYSTECTOMY performed by EDER HURT at  OR    [facility] PART REMV BLADDER,REIMPLNT URETER  age 6      Allergies   Allergen Reactions    Penicillins Rash      Social History     Tobacco Use    Smoking status: Never     Passive exposure: Never    Smokeless tobacco: Never   Substance Use Topics    Alcohol use: No     Comment: rare occ.      Wt Readings from Last 1 Encounters:   25 74.4 kg (164 lb)        Anesthesia Evaluation   Pt has had prior anesthetic.     History of anesthetic complications  - PONV.      ROS/MED HX  ENT/Pulmonary:  - neg pulmonary ROS     Neurologic:  - neg neurologic ROS     Cardiovascular:     (+)  hypertension- -   -  - -                                      METS/Exercise Tolerance:     Hematologic:  - neg hematologic  ROS     Musculoskeletal:  - neg musculoskeletal ROS     GI/Hepatic:  - neg GI/hepatic ROS     Renal/Genitourinary:       Endo:  - neg endo ROS     Psychiatric/Substance Use:  - neg psychiatric ROS     Infectious Disease:  - neg infectious disease ROS     Malignancy:  - neg malignancy ROS     Other:  - neg other ROS            Physical Exam  Airway  Mallampati: II  TM distance: >3 FB    Cardiovascular   Rhythm: regular  Rate: normal rate     Dental   (+) Minor Abnormalities -  "some fillings, tiny chips      Pulmonary Breath sounds clear to auscultation        Neurological - normal exam  She appears awake, alert and oriented x3.    Other Findings       OUTSIDE LABS:  CBC:   Lab Results   Component Value Date    WBC 6.1 06/23/2025    WBC 7.4 12/10/2024    HGB 13.4 06/23/2025    HGB 13.0 12/10/2024    HCT 40.4 06/23/2025    HCT 38.2 12/10/2024     06/23/2025     12/10/2024     BMP:   Lab Results   Component Value Date     06/23/2025     12/10/2024    POTASSIUM 4.0 06/23/2025    POTASSIUM 4.0 12/10/2024    CHLORIDE 106 06/23/2025    CHLORIDE 102 12/10/2024    CO2 25 06/23/2025    CO2 25 12/10/2024    BUN 14.2 06/23/2025    BUN 13.2 12/10/2024    CR 1.05 (H) 06/23/2025    CR 0.85 12/10/2024    GLC 97 06/23/2025    GLC 95 12/10/2024     COAGS: No results found for: \"PTT\", \"INR\", \"FIBR\"  POC:   Lab Results   Component Value Date    HCG Negative 10/27/2015     HEPATIC:   Lab Results   Component Value Date    ALBUMIN 4.6 06/23/2025    PROTTOTAL 7.3 06/23/2025    ALT 12 06/23/2025    AST 25 06/23/2025    ALKPHOS 74 06/23/2025    BILITOTAL 0.7 06/23/2025     OTHER:   Lab Results   Component Value Date    A1C 5.2 06/23/2025    SARA 9.7 06/23/2025    LIPASE 16 02/21/2024    AMYLASE 66 03/05/2013    TSH 2.08 06/23/2025    CRP <3.0 01/08/2009    SED 6 10/28/2015       Anesthesia Plan    ASA Status:  2      NPO Status: NPO Appropriate   Anesthesia Type: MAC.  Maintenance: TIVA.   Techniques and Equipment:       - Monitoring Plan: standard ASA monitoring     Consents    Anesthesia Plan(s) and associated risks, benefits, and realistic alternatives discussed. Questions answered and patient/representative(s) expressed understanding.     - Discussed:     - Discussed with:  Patient               Postoperative Care    Pain management: non-narcotic analgesics.     Comments:                   Zay Cabrera MD    I have reviewed the pertinent notes and labs in the chart from the past 30 " "days and (re)examined the patient.  Any updates or changes from those notes are reflected in this note.    Clinically Significant Risk Factors Present on Admission                   # Hypertension: Noted on problem list           # Overweight: Estimated body mass index is 25.5 kg/m  as calculated from the following:    Height as of 6/23/25: 1.708 m (5' 7.25\").    Weight as of this encounter: 74.4 kg (164 lb).                    "

## 2025-07-10 ENCOUNTER — PATIENT OUTREACH (OUTPATIENT)
Dept: CARE COORDINATION | Facility: CLINIC | Age: 60
End: 2025-07-10
Payer: COMMERCIAL

## 2025-07-23 PROBLEM — D12.6 TUBULAR ADENOMA OF COLON: Status: ACTIVE | Noted: 2025-07-23

## 2025-07-24 ENCOUNTER — PATIENT OUTREACH (OUTPATIENT)
Dept: GASTROENTEROLOGY | Facility: CLINIC | Age: 60
End: 2025-07-24
Payer: COMMERCIAL

## 2025-08-28 ENCOUNTER — OFFICE VISIT (OUTPATIENT)
Dept: OBGYN | Facility: CLINIC | Age: 60
End: 2025-08-28
Attending: UROLOGY
Payer: COMMERCIAL

## 2025-08-28 VITALS
SYSTOLIC BLOOD PRESSURE: 127 MMHG | BODY MASS INDEX: 25.93 KG/M2 | DIASTOLIC BLOOD PRESSURE: 81 MMHG | HEIGHT: 67 IN | WEIGHT: 165.2 LBS | HEART RATE: 57 BPM

## 2025-08-28 DIAGNOSIS — N81.6 RECTOCELE: ICD-10-CM

## 2025-08-28 DIAGNOSIS — N81.4 UTEROVAGINAL PROLAPSE: Primary | ICD-10-CM

## 2025-08-28 DIAGNOSIS — N81.11 MIDLINE CYSTOCELE: ICD-10-CM

## 2025-08-28 PROCEDURE — 99213 OFFICE O/P EST LOW 20 MIN: CPT | Performed by: OBSTETRICS & GYNECOLOGY

## 2025-08-28 RX ORDER — METRONIDAZOLE 500 MG/100ML
500 INJECTION, SOLUTION INTRAVENOUS
OUTPATIENT
Start: 2025-08-28

## 2025-08-28 RX ORDER — ACETAMINOPHEN 325 MG/1
975 TABLET ORAL ONCE
OUTPATIENT
Start: 2025-08-28 | End: 2025-08-28